# Patient Record
Sex: FEMALE | Race: OTHER | HISPANIC OR LATINO | Employment: FULL TIME | URBAN - METROPOLITAN AREA
[De-identification: names, ages, dates, MRNs, and addresses within clinical notes are randomized per-mention and may not be internally consistent; named-entity substitution may affect disease eponyms.]

---

## 2021-12-07 ENCOUNTER — TELEPHONE (OUTPATIENT)
Dept: SCHEDULING | Facility: IMAGING CENTER | Age: 36
End: 2021-12-07

## 2021-12-09 ENCOUNTER — TELEMEDICINE (OUTPATIENT)
Dept: MEDICAL GROUP | Facility: MEDICAL CENTER | Age: 36
End: 2021-12-09
Payer: COMMERCIAL

## 2021-12-09 VITALS — WEIGHT: 170 LBS | BODY MASS INDEX: 25.76 KG/M2 | TEMPERATURE: 98.6 F | HEIGHT: 68 IN

## 2021-12-09 DIAGNOSIS — Z00.00 HEALTHCARE MAINTENANCE: ICD-10-CM

## 2021-12-09 DIAGNOSIS — F41.1 GAD (GENERALIZED ANXIETY DISORDER): ICD-10-CM

## 2021-12-09 DIAGNOSIS — Z86.59 HISTORY OF BIPOLAR DISORDER: ICD-10-CM

## 2021-12-09 DIAGNOSIS — B97.7 HPV (HUMAN PAPILLOMA VIRUS) INFECTION: ICD-10-CM

## 2021-12-09 DIAGNOSIS — Z00.00 ENCOUNTER FOR MEDICAL EXAMINATION TO ESTABLISH CARE: ICD-10-CM

## 2021-12-09 PROCEDURE — 99204 OFFICE O/P NEW MOD 45 MIN: CPT | Mod: 95,CR | Performed by: PHYSICIAN ASSISTANT

## 2021-12-09 NOTE — ASSESSMENT & PLAN NOTE
Had LEEP procedure in January 2021. Was told she needs follow up every 3 months to check for abnormal cells. Would like Gyn referral

## 2021-12-09 NOTE — ASSESSMENT & PLAN NOTE
Patient is 36-year-old female who is doing virtual visit to establish care.  Just moved to [place] from [place] and is working at [employer].  Works approximately 6 shifts a week, 12 hours.  Has a history of psychiatric illnesses.  States she is very anxious at this time after finding her best friend  in her bed in the beginning of this year.  She states that was secondary to an accidental overdose.  She states she is traumatized from this but feels so anxious that she is having trouble functioning.  States she has been diagnosed with bipolar in the past phalanx anxiety and depression.  She was on multiple medications but has been off of medications for some time.  Would like something to help with anxiety at this time and referral to see a psychiatrist denies homicidal ideation or suicidal ideation. Denies manic episodes currently

## 2021-12-09 NOTE — PROGRESS NOTES
Virtual Visit: New Patient   This visit was conducted via Zoom using secure and encrypted videoconferencing technology. The patient was in a private location in the state of [place].    The patient's identity was confirmed and verbal consent was obtained for this virtual visit.    Subjective:     CC:   Chief Complaint   Patient presents with   • Establish Care   • Referral Needed     GYN - cancerous cervical cells, psychiatry       Mago Craven is a 36 y.o. female presenting to establish care and to discuss the evaluation and management of:    HPV (human papilloma virus) infection  Had LEEP procedure in 2021. Was told she needs follow up every 3 months to check for abnormal cells. Would like Gyn referral    SANTA (generalized anxiety disorder)  Patient is 36-year-old female who is doing virtual visit to establish care.  Just moved to [place] from [place] and is working at [employer].  Works approximately 6 shifts a week, 12 hours.  Has a history of psychiatric illnesses.  States she is very anxious at this time after finding her best friend  in her bed in the beginning of this year.  She states that was secondary to an accidental overdose.  She states she is traumatized from this but feels so anxious that she is having trouble functioning.  States she has been diagnosed with bipolar in the past phalanx anxiety and depression.  She was on multiple medications but has been off of medications for some time.  Would like something to help with anxiety at this time and referral to see a psychiatrist denies homicidal ideation or suicidal ideation. Denies manic episodes currently       ROS  See HPI  Constitutional: Negative for fever, chills and malaise/fatigue.   HENT: Negative for congestion.    Eyes: Negative for pain.   Respiratory: Negative for cough and shortness of breath.    Cardiovascular: Negative for leg swelling.   Gastrointestinal: Negative for nausea, vomiting, abdominal pain and diarrhea.  "  Genitourinary: Negative for dysuria and hematuria.   Skin: Negative for rash.   Neurological: Negative for dizziness, focal weakness and headaches.   Endo/Heme/Allergies: Does not bruise/bleed easily.   Psychiatric/Behavioral: Negative for depression.  The patient is not nervous/anxious.      Allergies   Allergen Reactions   • Penicillins Unspecified     Unknown to patient.       Current medicines (including changes today)  No current outpatient medications on file.     No current facility-administered medications for this visit.       She  has a past medical history of Anemia, Anxiety, Depression, Head ache, and Herpes simplex type 1 infection.  She  has a past surgical history that includes gastric bypass laparoscopic (2006) and cholecystectomy (2011).      Family History   Problem Relation Age of Onset   • Ovarian Cancer Mother      Family Status   Relation Name Status   • Mo  (Not Specified)       Patient Active Problem List    Diagnosis Date Noted   • HPV (human papilloma virus) infection 12/09/2021   • SANTA (generalized anxiety disorder) 12/09/2021          Objective:   Temp 37 °C (98.6 °F)   Ht 1.727 m (5' 8\")   Wt 77.1 kg (170 lb)   LMP 11/27/2021   Breastfeeding No   BMI 25.85 kg/m²     Physical Exam:  Constitutional: Alert, no distress, well-groomed.  Skin: No rashes in visible areas.  Eye: Round. Conjunctiva clear, lids normal. No icterus.   ENMT: Lips pink without lesions, good dentition, moist mucous membranes. Phonation normal.  Neck: No masses, no thyromegaly. Moves freely without pain.  Respiratory: Unlabored respiratory effort, no cough or audible wheeze  Psych: Alert and oriented x3, normal affect and mood.       Assessment and Plan:   The following treatment plan was discussed:     1. Encounter for medical examination to establish care  Discussed importance of diet modification.  Increase protein intake and fruit and vegetables.  Avoid carbohydrate consumption.    2. HPV (human papilloma " virus) infection  Patient has a history of HPV Paps with LEEP done in January 2021.  Was told she needs follow-up every 3 months.  Offered to do repeat Pap with HPV testing in clinic.  Patient would like referral to gynecology  - Referral to Gynecology    3. SANTA (generalized anxiety disorder)  Is a chronic condition with acute exacerbation.  We will try sertraline low-dose and send to psychiatry.  - Comp Metabolic Panel; Future  - CBC WITH DIFFERENTIAL; Future  - TSH WITH REFLEX TO FT4; Future  - VITAMIN D,25 HYDROXY; Future  - Referral to Psychiatry    4. Healthcare maintenance  Above    5. History of bipolar disorder  This is a chronic condition that patient is not being medicated for.  Unclear if true diagnosis.  Do want her evaluated by psychiatry for proper diagnosis and medical management  - Referral to Psychiatry         Follow-up: Return in about 4 weeks (around 1/6/2022).

## 2021-12-13 DIAGNOSIS — F41.1 GAD (GENERALIZED ANXIETY DISORDER): ICD-10-CM

## 2021-12-13 RX ORDER — SERTRALINE HYDROCHLORIDE 25 MG/1
25 TABLET, FILM COATED ORAL DAILY
Qty: 30 TABLET | Refills: 3 | Status: SHIPPED
Start: 2021-12-13 | End: 2022-02-03

## 2021-12-14 ENCOUNTER — TELEPHONE (OUTPATIENT)
Dept: MEDICAL GROUP | Facility: MEDICAL CENTER | Age: 36
End: 2021-12-14

## 2021-12-14 NOTE — TELEPHONE ENCOUNTER
Hi Lisa  Medication has been sent in for the patient and I did send her my chart message.  Thank you for the message

## 2021-12-14 NOTE — TELEPHONE ENCOUNTER
VOICEMAIL  1. Caller Name: Mago                      Call Back Number: [phone]    2. Message: Patient called to ask about prescriptions discussed in recent virtual visit to establish care. Please advise.    3. Patient approves office to leave a detailed voicemail/MyChart message: N\A

## 2022-02-03 ENCOUNTER — TELEMEDICINE (OUTPATIENT)
Dept: MEDICAL GROUP | Facility: MEDICAL CENTER | Age: 37
End: 2022-02-03
Payer: COMMERCIAL

## 2022-02-03 VITALS — WEIGHT: 163 LBS | BODY MASS INDEX: 24.71 KG/M2 | HEIGHT: 68 IN | TEMPERATURE: 96.8 F

## 2022-02-03 DIAGNOSIS — F41.1 GAD (GENERALIZED ANXIETY DISORDER): ICD-10-CM

## 2022-02-03 PROCEDURE — 99213 OFFICE O/P EST LOW 20 MIN: CPT | Mod: 95 | Performed by: PHYSICIAN ASSISTANT

## 2022-02-03 RX ORDER — ALPRAZOLAM 0.25 MG/1
0.25 TABLET ORAL NIGHTLY PRN
Qty: 30 TABLET | Refills: 0 | Status: SHIPPED | OUTPATIENT
Start: 2022-02-03 | End: 2022-03-04 | Stop reason: SDUPTHER

## 2022-02-03 ASSESSMENT — PATIENT HEALTH QUESTIONNAIRE - PHQ9: CLINICAL INTERPRETATION OF PHQ2 SCORE: 0

## 2022-02-03 NOTE — ASSESSMENT & PLAN NOTE
Is here for follow-up virtual visit.  Was started on Zoloft at last visit.  Patient states she stopped taking Zoloft but does feel it helped her focus more.  She was not sure if she should continue it or not.  She was given referral to psychiatry as well and states she did not make the appointment.  Has been working at [employer].  Has not been drinking alcohol.  Still feels very anxious.  Denies homicidal ideation or suicidal ideation

## 2022-02-03 NOTE — PROGRESS NOTES
Virtual Visit: Established Patient   This visit was conducted via Zoom using secure and encrypted videoconferencing technology. The patient was in a private location in the state of [place].    The patient's identity was confirmed and verbal consent was obtained for this virtual visit.    Subjective:   CC:   Chief Complaint   Patient presents with   • Follow-Up     med review   • Anxiety   • Referral Needed     OBGYGILBERT       Mago Craven is a 36 y.o. female presenting for evaluation and management of:    SANTA (generalized anxiety disorder)  Is here for follow-up virtual visit.  Was started on Zoloft at last visit.  Patient states she stopped taking Zoloft but does feel it helped her focus more.  She was not sure if she should continue it or not.  She was given referral to psychiatry as well and states she did not make the appointment.  Has been working at [employer].  Has not been drinking alcohol.  Still feels very anxious.  Denies homicidal ideation or suicidal ideation       ROS   Denies any recent fevers or chills. No nausea or vomiting. No chest pains or shortness of breath.     Allergies   Allergen Reactions   • Penicillins Unspecified     Unknown to patient.       Current medicines (including changes today)  Current Outpatient Medications   Medication Sig Dispense Refill   • sertraline (ZOLOFT) 50 MG Tab Take 1/2 pill daily for two weeks. Thereafter, take one pill daily in the morning. 30 Tablet 2   • ALPRAZolam (XANAX) 0.25 MG Tab Take 1 Tablet by mouth at bedtime as needed for Sleep or Anxiety for up to 30 days. 30 Tablet 0     No current facility-administered medications for this visit.       Patient Active Problem List    Diagnosis Date Noted   • HPV (human papilloma virus) infection 12/09/2021   • SANTA (generalized anxiety disorder) 12/09/2021       Family History   Problem Relation Age of Onset   • Ovarian Cancer Mother        She  has a past medical history of Anemia, Anxiety, Depression, Head ache, and  "Herpes simplex type 1 infection.  She  has a past surgical history that includes gastric bypass laparoscopic (2006) and cholecystectomy (2011).       Objective:   Temp 36 °C (96.8 °F)   Ht 1.727 m (5' 8\")   Wt 73.9 kg (163 lb)   BMI 24.78 kg/m²     Physical Exam:  Constitutional: Alert, no distress, well-groomed.  Skin: No rashes in visible areas.  Eye: Round. Conjunctiva clear, lids normal. No icterus.   ENMT: Lips pink without lesions, good dentition, moist mucous membranes. Phonation normal.  Neck: No masses, no thyromegaly. Moves freely without pain.  Respiratory: Unlabored respiratory effort, no cough or audible wheeze  Psych: Alert and oriented x3, normal affect and mood.       Assessment and Plan:   The following treatment plan was discussed:     1. SANTA (generalized anxiety disorder)  Chronic and unstable  Discussed lifestyle and exercise at length. Must exercise. Eat fresh fruits and veggies  -referral already given for psychiatry. Please make appointment  - sertraline (ZOLOFT) 50 MG Tab; Take 1/2 pill daily for two weeks. Thereafter, take one pill daily in the morning.  Dispense: 30 Tablet; Refill: 2        Follow-up: No follow-ups on file.         "

## 2022-03-04 DIAGNOSIS — F41.1 GAD (GENERALIZED ANXIETY DISORDER): ICD-10-CM

## 2022-03-08 RX ORDER — ALPRAZOLAM 0.25 MG/1
0.25 TABLET ORAL NIGHTLY PRN
Qty: 30 TABLET | Refills: 0 | OUTPATIENT
Start: 2022-03-08 | End: 2022-04-07

## 2022-03-08 RX ORDER — ALPRAZOLAM 0.25 MG/1
0.25 TABLET ORAL NIGHTLY PRN
Qty: 30 TABLET | Refills: 0 | Status: SHIPPED | OUTPATIENT
Start: 2022-03-08 | End: 2022-04-07

## 2023-05-14 ENCOUNTER — APPOINTMENT (OUTPATIENT)
Dept: RADIOLOGY | Facility: IMAGING CENTER | Age: 38
End: 2023-05-14
Attending: NURSE PRACTITIONER
Payer: COMMERCIAL

## 2023-05-14 ENCOUNTER — OFFICE VISIT (OUTPATIENT)
Dept: URGENT CARE | Facility: CLINIC | Age: 38
End: 2023-05-14
Payer: COMMERCIAL

## 2023-05-14 VITALS
RESPIRATION RATE: 16 BRPM | DIASTOLIC BLOOD PRESSURE: 74 MMHG | SYSTOLIC BLOOD PRESSURE: 100 MMHG | HEART RATE: 72 BPM | TEMPERATURE: 97.9 F | HEIGHT: 67 IN | BODY MASS INDEX: 24.33 KG/M2 | WEIGHT: 155 LBS | OXYGEN SATURATION: 98 %

## 2023-05-14 DIAGNOSIS — S92.514A CLOSED NONDISPLACED FRACTURE OF PROXIMAL PHALANX OF LESSER TOE OF RIGHT FOOT, INITIAL ENCOUNTER: ICD-10-CM

## 2023-05-14 DIAGNOSIS — M79.674 TOE PAIN, RIGHT: ICD-10-CM

## 2023-05-14 PROCEDURE — 73660 X-RAY EXAM OF TOE(S): CPT | Mod: TC,RT | Performed by: NURSE PRACTITIONER

## 2023-05-14 PROCEDURE — 3074F SYST BP LT 130 MM HG: CPT | Performed by: NURSE PRACTITIONER

## 2023-05-14 PROCEDURE — 3078F DIAST BP <80 MM HG: CPT | Performed by: NURSE PRACTITIONER

## 2023-05-14 PROCEDURE — 99213 OFFICE O/P EST LOW 20 MIN: CPT | Performed by: NURSE PRACTITIONER

## 2023-05-14 NOTE — LETTER
May 14, 2023    To Whom It May Concern:         This is confirmation that Mago Craven attended her scheduled appointment with LIAM Myers on 5/14/23.  Please let her to work in a light duty fashion for the next 2 weeks until seen by our sports medicine physician due to an acute fracture.         If you have any questions please do not hesitate to call me at the phone number listed below.    Sincerely,          DB Myers.  [phone]

## 2023-05-15 NOTE — PROGRESS NOTES
"Subjective:   Mago Craven is a 38 y.o. female who presents for Toe Injury (R pinky toe)       HPI  Patient presents for evaluation of right toe injury, after dropping of the dorsum of her pinky toe against an ottoman.  Patient had immediate pain and difficulty with standing/walking.  She has not taken anything yet for his symptoms as a happened immediately prior to this visit.  Denies numbness, tingling, or loss of sensation.    ROS  All other systems are negative except as documented above within HPI.    MEDS:   Current Outpatient Medications:     sertraline (ZOLOFT) 50 MG Tab, Take 1/2 pill daily for two weeks. Thereafter, take one pill daily in the morning. (Patient not taking: Reported on 5/14/2023), Disp: 30 Tablet, Rfl: 2  ALLERGIES:   Allergies   Allergen Reactions    Penicillins Unspecified     Unknown to patient.       Patient's PMH, SocHx, SurgHx, FamHx, Drug allergies and medications were reviewed.     Objective:   /74 (BP Location: Left arm, Patient Position: Sitting, BP Cuff Size: Adult)   Pulse 72   Temp 36.6 °C (97.9 °F) (Temporal)   Resp 16   Ht 1.702 m (5' 7\")   Wt 70.3 kg (155 lb)   SpO2 98%   BMI 24.28 kg/m²     Physical Exam  Vitals and nursing note reviewed.   Constitutional:       General: She is awake.      Appearance: Normal appearance. She is well-developed.   HENT:      Head: Normocephalic and atraumatic.      Right Ear: External ear normal.      Left Ear: External ear normal.      Nose: Nose normal.      Mouth/Throat:      Mouth: Mucous membranes are moist.      Pharynx: Oropharynx is clear.   Eyes:      Extraocular Movements: Extraocular movements intact.      Conjunctiva/sclera: Conjunctivae normal.   Cardiovascular:      Rate and Rhythm: Normal rate and regular rhythm.   Pulmonary:      Effort: Pulmonary effort is normal.      Breath sounds: Normal breath sounds.   Musculoskeletal:         General: Normal range of motion.      Cervical back: Normal range of motion and " neck supple.        Feet:    Feet:      Comments: +edema, ecchymosis  Skin:     General: Skin is warm and dry.   Neurological:      Mental Status: She is alert and oriented to person, place, and time.   Psychiatric:         Mood and Affect: Mood normal.         Behavior: Behavior normal.         Thought Content: Thought content normal.       DX-TOE(S) 2+ RIGHT    Result Date: 5/14/2023 5/14/2023 9:00 PM HISTORY/REASON FOR EXAM:  Pain/Deformity Following Trauma; 5th toe. TECHNIQUE/EXAM DESCRIPTION AND NUMBER OF VIEWS:  3 views of the RIGHT toes. COMPARISON: None FINDINGS: There is fifth toe proximal phalanx neck fracture.     1.  Fifth toe proximal phalanx neck fracture     Assessment/Plan:   Assessment    1. Closed nondisplaced fracture of proximal phalanx of lesser toe of right foot, initial encounter    2. Toe pain, right  - DX-TOE(S) 2+ RIGHT; Future      Vital signs stable at today's acute urgent care visit.  Orion taped toes in clinic.  Discussed RICE therapies.    Advised the patient to follow-up with the primary care provider/urgent care if symptoms persist.  Red flags discussed and indications to immediately call 911 or present to the ED. All questions were encouraged and answered to the patient's satisfaction and understanding, and they agree to the plan of care.     This is an acute problem with uncertain prognosis, medication management and instructions as well as management options were provided.  I personally reviewed prior external notes and test results pertinent to today and independently reviewed and interpreted all diagnostics, to include POC testing. Time spent evaluating this patient includes preparing for visit, counseling/education, exam, evaluation, obtaining history, and ordering lab/test/procedures.      Please note that this dictation was created using voice recognition software. I have made a reasonable attempt to correct obvious errors, but I expect that there are errors of grammar and  possibly content that I did not discover before finalizing the note.

## 2023-09-17 ENCOUNTER — HOSPITAL ENCOUNTER (EMERGENCY)
Facility: MEDICAL CENTER | Age: 38
End: 2023-09-17
Attending: EMERGENCY MEDICINE
Payer: COMMERCIAL

## 2023-09-17 ENCOUNTER — OFFICE VISIT (OUTPATIENT)
Dept: URGENT CARE | Facility: CLINIC | Age: 38
End: 2023-09-17
Payer: COMMERCIAL

## 2023-09-17 ENCOUNTER — APPOINTMENT (OUTPATIENT)
Dept: RADIOLOGY | Facility: MEDICAL CENTER | Age: 38
End: 2023-09-17
Attending: EMERGENCY MEDICINE
Payer: COMMERCIAL

## 2023-09-17 VITALS
WEIGHT: 161.38 LBS | DIASTOLIC BLOOD PRESSURE: 69 MMHG | OXYGEN SATURATION: 97 % | HEART RATE: 64 BPM | RESPIRATION RATE: 18 BRPM | BODY MASS INDEX: 24.46 KG/M2 | SYSTOLIC BLOOD PRESSURE: 110 MMHG | TEMPERATURE: 98.5 F | HEIGHT: 68 IN

## 2023-09-17 VITALS
HEART RATE: 87 BPM | BODY MASS INDEX: 24.43 KG/M2 | OXYGEN SATURATION: 98 % | DIASTOLIC BLOOD PRESSURE: 64 MMHG | WEIGHT: 161.2 LBS | RESPIRATION RATE: 20 BRPM | HEIGHT: 68 IN | SYSTOLIC BLOOD PRESSURE: 116 MMHG | TEMPERATURE: 98 F

## 2023-09-17 DIAGNOSIS — M54.2 NECK PAIN: ICD-10-CM

## 2023-09-17 DIAGNOSIS — R51.9 NONINTRACTABLE HEADACHE, UNSPECIFIED CHRONICITY PATTERN, UNSPECIFIED HEADACHE TYPE: ICD-10-CM

## 2023-09-17 DIAGNOSIS — R51.9 ACUTE NONINTRACTABLE HEADACHE, UNSPECIFIED HEADACHE TYPE: ICD-10-CM

## 2023-09-17 PROCEDURE — 700111 HCHG RX REV CODE 636 W/ 250 OVERRIDE (IP): Mod: JZ | Performed by: EMERGENCY MEDICINE

## 2023-09-17 PROCEDURE — 96372 THER/PROPH/DIAG INJ SC/IM: CPT

## 2023-09-17 PROCEDURE — 3074F SYST BP LT 130 MM HG: CPT | Performed by: FAMILY MEDICINE

## 2023-09-17 PROCEDURE — 70450 CT HEAD/BRAIN W/O DYE: CPT

## 2023-09-17 PROCEDURE — 3078F DIAST BP <80 MM HG: CPT | Performed by: FAMILY MEDICINE

## 2023-09-17 PROCEDURE — 99283 EMERGENCY DEPT VISIT LOW MDM: CPT

## 2023-09-17 PROCEDURE — 99214 OFFICE O/P EST MOD 30 MIN: CPT | Performed by: FAMILY MEDICINE

## 2023-09-17 RX ORDER — IBUPROFEN 100 MG/1
400 TABLET, CHEWABLE ORAL EVERY 8 HOURS PRN
Qty: 60 TABLET | Refills: 0 | Status: SHIPPED | OUTPATIENT
Start: 2023-09-17

## 2023-09-17 RX ORDER — KETOROLAC TROMETHAMINE 30 MG/ML
60 INJECTION, SOLUTION INTRAMUSCULAR; INTRAVENOUS ONCE
Status: COMPLETED | OUTPATIENT
Start: 2023-09-17 | End: 2023-09-17

## 2023-09-17 RX ADMIN — KETOROLAC TROMETHAMINE 60 MG: 30 INJECTION, SOLUTION INTRAMUSCULAR at 18:14

## 2023-09-17 ASSESSMENT — ENCOUNTER SYMPTOMS
MYALGIAS: 0
DOUBLE VISION: 0
DIZZINESS: 1
CHILLS: 0
TINGLING: 1
HEADACHES: 1
NECK PAIN: 1
FEVER: 0
FOCAL WEAKNESS: 0
BLURRED VISION: 0
SORE THROAT: 0
VOMITING: 0
NAUSEA: 0
COUGH: 0
SHORTNESS OF BREATH: 0

## 2023-09-18 NOTE — ED NOTES
Bedside report received from previous shift.   Assumed patient care. Verified patient identification.  Checked on bed, connected to monitor,  with unlabored respirations. Discussed plan of care.   Vital signs is stable. Denied any new complaints.   Gurney in low position, side rail up for pt safety. Call light within reach.   No needs identified at the moment. Instructed to use call light when needed.    Contraptions: None  Alert and Oriented: x 4  Ambulatory: yes  Oxygen: No  Pending:None

## 2023-09-18 NOTE — ED NOTES
Pt discharged to home. Discharge paperwork provided. Education provided by ERP. Reinforced discharge instructions.  Pt was given follow up instructions and prescriptions.  Pt verbalized understanding of all instructions for discharge.   Patient went out of the ER ambulatory with steady gait., alert and oriented x 4, with all belongings.

## 2023-09-18 NOTE — PROGRESS NOTES
Subjective:   Mago Craven is a 38 y.o. female who presents for Headache (x3-6 months Head pressure/loss of hearing/)        38-year-old presents to the urgent care with chief complaint of headache, sudden onset yesterday at 10 PM described as worst headache ever experienced which caused patient to become tearful and was associated with perioral numbness and tingling.  Patient reports a headache has continued and is limiting ability to function at work.  Patient describes a sudden onset of the headache and describes never having experienced a similar headache previously.  The patient reports intermittent neck pain at work and has received manual manipulation of the neck on the worksite.  Continues to report of intermittent neck pain and limitations in full range of motion of the neck.  The patient does specify the worst pain is take at this time and denies severe or limiting neck pain.  Denies nausea or vomiting.  Patient denies loss of vision.  Reports intermittent ataxia with walking.    Other  Chronicity: Reports severe sudden onset headache. The current episode started yesterday. The problem occurs constantly. The problem has been unchanged. Associated symptoms include headaches and neck pain. Pertinent negatives include no chills, coughing, fever, myalgias, nausea, rash, sore throat or vomiting. She has tried rest for the symptoms. The treatment provided no relief.     PMH:  has a past medical history of Anemia, Anxiety, Depression, Head ache, and Herpes simplex type 1 infection.  MEDS:   Current Outpatient Medications:     sertraline (ZOLOFT) 50 MG Tab, Take 1/2 pill daily for two weeks. Thereafter, take one pill daily in the morning. (Patient not taking: Reported on 5/14/2023), Disp: 30 Tablet, Rfl: 2  ALLERGIES:   Allergies   Allergen Reactions    Penicillins Unspecified     Unknown to patient.     SURGHX:   Past Surgical History:   Procedure Laterality Date    CHOLECYSTECTOMY  2011    GASTRIC BYPASS  "LAPAROSCOPIC  2006     SOCHX:  reports that she has been smoking cigarettes. She has never used smokeless tobacco. She reports that she does not currently use alcohol. She reports that she does not currently use drugs.  FH:   Family History   Problem Relation Age of Onset    Ovarian Cancer Mother      Review of Systems   Constitutional:  Negative for chills and fever.   HENT:  Negative for sore throat.    Eyes:  Negative for blurred vision and double vision.   Respiratory:  Negative for cough and shortness of breath.    Gastrointestinal:  Negative for nausea and vomiting.   Musculoskeletal:  Positive for neck pain. Negative for myalgias.   Skin:  Negative for rash.   Neurological:  Positive for dizziness, tingling and headaches. Negative for focal weakness.        Objective:   /64 (BP Location: Left arm, Patient Position: Sitting)   Pulse 87   Temp 36.7 °C (98 °F) (Temporal)   Resp 20   Ht 1.727 m (5' 8\")   Wt 73.1 kg (161 lb 3.2 oz)   LMP 08/28/2023 (Exact Date)   SpO2 98%   BMI 24.51 kg/m²   Physical Exam  Vitals and nursing note reviewed.   Constitutional:       General: She is not in acute distress.     Appearance: She is well-developed.   HENT:      Head: Normocephalic and atraumatic.      Right Ear: External ear normal.      Left Ear: External ear normal.      Nose: Nose normal.      Mouth/Throat:      Mouth: Mucous membranes are moist.   Eyes:      Extraocular Movements: Extraocular movements intact.      Conjunctiva/sclera: Conjunctivae normal.      Pupils: Pupils are equal, round, and reactive to light.   Neck:      Comments: Range of motion of the neck intact yet with limitation in endrange bilateral rotation, flexion and extension  Cardiovascular:      Rate and Rhythm: Normal rate.   Pulmonary:      Effort: Pulmonary effort is normal. No respiratory distress.      Breath sounds: Normal breath sounds.   Abdominal:      General: There is no distension.   Musculoskeletal:         General: " Normal range of motion.      Cervical back: No rigidity.   Skin:     General: Skin is warm and dry.   Neurological:      General: No focal deficit present.      Mental Status: She is alert and oriented to person, place, and time. Mental status is at baseline.      Coordination: Romberg sign negative. Coordination abnormal (Mild ataxia heel toe).      Gait: Gait (gait at baseline) normal.   Psychiatric:         Judgment: Judgment normal.           Assessment/Plan:   1. Acute nonintractable headache, unspecified headache type    2. Neck pain        Medical Decision Making/Course:  The context of the clinical presentation of sudden onset limiting headache with neurological symptoms with ataxia and with recent history of cervical manual manipulation there is concern for subarachnoid hemorrhage, vertebral artery dissection, and other intracerebral and vascular etiologies and emergency department evaluation management is warranted.  The patient deferred EMS ambulance transfer requested transport by private vehicle and the [facility] emergency department was advised.  In the course of preparing for this visit with review of the pertinent past medical history, recent and past clinic visits, current medications, and performing chart, immunization, medical history and medication reconciliation, and in the further course of obtaining the current history pertinent to the clinic visit today, performing an exam and evaluation, ordering and independently evaluating labs, tests  , and/or procedures, prescribing any recommended new medications as noted above, providing any pertinent counseling and education and recommending further coordination of care including contact and coordination with transfer center, at least  22 minutes of total time were spent during this encounter.        Please note that this dictation was created using voice recognition software. I have worked with consultants from the vendor as well  as technical experts from [facility] to optimize the interface. I have made every reasonable attempt to correct obvious errors, but I expect that there are errors of grammar and possibly content that I did not discover before finalizing the note.

## 2023-09-18 NOTE — ED PROVIDER NOTES
"CHIEF COMPLAINT  Chief Complaint   Patient presents with    Headache     Since yesterday, HX of migraines        HPI  Mago Craven is a 38 y.o. female who presents with headache.  Patient reports that almost every day she has a headache.  She does a lot of stressful work at [employer].  She is required to do lifting.  Her headaches are provoked by loud noises.  The head hurts in the back of the head radiates around to the front.  Over the last few days the headaches seem worse.  No thunderclap.  She feels her neck is \"crunchy \" but no neck stiffness.  She was told by the masseuse at work that she has lumps in her neck.  She has not had a fever.  No congestion runny nose.  She has not taken any medication for her headache as she reports that she has a difficult time taking pills.  No vision change, weakness numbness neurologic symptoms.  No eye pain.    REVIEW OF SYSTEMS  See HPI for further details. All other systems are negative.     PAST MEDICAL HISTORY  Past Medical History:   Diagnosis Date    Anemia     hx of gastric surgery    Anxiety     Depression     Head ache     Herpes simplex type 1 infection        FAMILY HISTORY  Family History   Problem Relation Age of Onset    Ovarian Cancer Mother        SOCIAL HISTORY  Social History     Socioeconomic History    Marital status: Single   Tobacco Use    Smoking status: Some Days     Types: Cigarettes    Smokeless tobacco: Never   Vaping Use    Vaping Use: Never used   Substance and Sexual Activity    Alcohol use: Not Currently    Drug use: Not Currently       SURGICAL HISTORY  Past Surgical History:   Procedure Laterality Date    CHOLECYSTECTOMY  2011    GASTRIC BYPASS LAPAROSCOPIC  2006       CURRENT MEDICATIONS  Home Medications       Reviewed by Mimi Haas R.N. (Registered Nurse) on 09/17/23 at 1731  Med List Status: Partial     Medication Last Dose Status   sertraline (ZOLOFT) 50 MG Tab  Active                    ALLERGIES  Allergies   Allergen Reactions " "   Penicillins Unspecified     Unknown to patient.       PHYSICAL EXAM  VITAL SIGNS: /64   Pulse 86   Temp 36.3 °C (97.3 °F) (Temporal)   Resp 18   Ht 1.727 m (5' 8\")   Wt 73.2 kg (161 lb 6 oz)   LMP 08/28/2023 (Exact Date)   SpO2 98%   BMI 24.54 kg/m²       Constitutional:  Well developed, Well nourished, No acute distress, Non-toxic appearance.   HENT: Head is nontender.  Tympanic membranes clear.  Eyes: PERRLA, EOMI, Conjunctiva normal, No discharge.   Neck: Normal range of motion, No tenderness, Supple, No stridor.  No neck mass  Lymphatic: No lymphadenopathy noted.   Cardiovascular: Normal heart rate  Thorax & Lungs: No respiratory distress  Skin: Warm, Dry, No erythema, No rash.   Extremities: Intact distal pulses, No edema, No tenderness, No cyanosis, No clubbing.   Neurologic: Alert & oriented x 3, Normal motor function, Normal sensory function, No focal deficits noted.   Psychiatric: Affect normal, Judgment normal, Mood normal.     RADIOLOGY/PROCEDURES  CT head negative on my review    CT-HEAD W/O   Final Result      Negative noncontrast CT scan of the head / brain.               COURSE & MEDICAL DECISION MAKING  Patient presents with acute headache.  Her vital signs are normal.  She is afebrile.  She has no neurologic symptoms.  No thunderclap.  No ocular complaints.  No major red flags aside from the fact that she is having a headache every day.  Because of this I ordered a CT scan of the head.  She was treated with Toradol.    Patient felt better after Toradol.    CT head was negative    At this point I suspect this is a tension type headache.  Likely worsened with night shift as well as heavy lifting and tension at work.  Management is symptomatic.  She cannot take pills very well I have prescribed ibuprofen chewables.  She will be discharged.  I would like her to follow-up with her doctor for recheck.  I precautioned her to return to the ER if she has any neurologic symptoms, vision " change, uncontrolled headache with treatment or concern      Considered blood work, but not indicated secondary to a lack of fever or alarming findings on examination    Prescribed ibuprofen    FINAL IMPRESSION  1.  Benign cephalgia, tension variant        Electronically signed by: Kelechi Joseph M.D., 9/17/2023 6:21 PM

## 2023-09-18 NOTE — ED TRIAGE NOTES
Chief Complaint   Patient presents with    Headache     Since yesterday, HX of migraines       Pt ambulate to triage with above complaint. Pt reports this headache is so bad it is making her lips tingle. Feel like she has increased sinus pressure.    Pt educated on triage process and returned to lobby.

## 2023-09-18 NOTE — ED NOTES
PT STATES INTERMITTENT HEADACHES FOR THE PAST 3-6 MONTHS. PATIENT STATES BAD HA SINCE LAST NIGHT WITH LIP TINGLING AND STATES SHE CAN HEAR HERSELF SPEAK.     PT IS RESTING IN BED. BREATHING IS EVEN AND UNLABORED, SKIN IS WARM AND DRY, VSS, NAD, WILL CONTINUE TO MONITOR. PT FAMILY AT BEDSIDE. PENDING MD EVALUATION.

## 2023-11-08 ENCOUNTER — HOSPITAL ENCOUNTER (EMERGENCY)
Facility: MEDICAL CENTER | Age: 38
End: 2023-11-08
Attending: EMERGENCY MEDICINE
Payer: COMMERCIAL

## 2023-11-08 ENCOUNTER — APPOINTMENT (OUTPATIENT)
Dept: RADIOLOGY | Facility: MEDICAL CENTER | Age: 38
End: 2023-11-08
Attending: EMERGENCY MEDICINE
Payer: COMMERCIAL

## 2023-11-08 VITALS
RESPIRATION RATE: 17 BRPM | OXYGEN SATURATION: 93 % | HEIGHT: 68 IN | SYSTOLIC BLOOD PRESSURE: 94 MMHG | BODY MASS INDEX: 25.23 KG/M2 | HEART RATE: 60 BPM | DIASTOLIC BLOOD PRESSURE: 61 MMHG | WEIGHT: 166.45 LBS | TEMPERATURE: 98.1 F

## 2023-11-08 DIAGNOSIS — D64.9 CHRONIC ANEMIA: ICD-10-CM

## 2023-11-08 DIAGNOSIS — N83.202 OVARIAN CYST, LEFT: ICD-10-CM

## 2023-11-08 LAB
ALBUMIN SERPL BCP-MCNC: 4.2 G/DL (ref 3.2–4.9)
ALBUMIN/GLOB SERPL: 1.4 G/DL
ALP SERPL-CCNC: 71 U/L (ref 30–99)
ALT SERPL-CCNC: 12 U/L (ref 2–50)
ANION GAP SERPL CALC-SCNC: 11 MMOL/L (ref 7–16)
AST SERPL-CCNC: 20 U/L (ref 12–45)
BASOPHILS # BLD AUTO: 0.4 % (ref 0–1.8)
BASOPHILS # BLD: 0.03 K/UL (ref 0–0.12)
BILIRUB SERPL-MCNC: 0.4 MG/DL (ref 0.1–1.5)
BUN SERPL-MCNC: 11 MG/DL (ref 8–22)
CALCIUM ALBUM COR SERPL-MCNC: 8.6 MG/DL (ref 8.5–10.5)
CALCIUM SERPL-MCNC: 8.8 MG/DL (ref 8.5–10.5)
CHLORIDE SERPL-SCNC: 105 MMOL/L (ref 96–112)
CO2 SERPL-SCNC: 21 MMOL/L (ref 20–33)
COMMENT 1642: NORMAL
CREAT SERPL-MCNC: 0.68 MG/DL (ref 0.5–1.4)
EOSINOPHIL # BLD AUTO: 0.13 K/UL (ref 0–0.51)
EOSINOPHIL NFR BLD: 1.9 % (ref 0–6.9)
ERYTHROCYTE [DISTWIDTH] IN BLOOD BY AUTOMATED COUNT: 41.2 FL (ref 35.9–50)
GFR SERPLBLD CREATININE-BSD FMLA CKD-EPI: 113 ML/MIN/1.73 M 2
GLOBULIN SER CALC-MCNC: 2.9 G/DL (ref 1.9–3.5)
GLUCOSE SERPL-MCNC: 128 MG/DL (ref 65–99)
HCG SERPL QL: NEGATIVE
HCT VFR BLD AUTO: 31.1 % (ref 37–47)
HGB BLD-MCNC: 8.1 G/DL (ref 12–16)
HYPOCHROMIA BLD QL SMEAR: ABNORMAL
IMM GRANULOCYTES # BLD AUTO: 0.02 K/UL (ref 0–0.11)
IMM GRANULOCYTES NFR BLD AUTO: 0.3 % (ref 0–0.9)
LIPASE SERPL-CCNC: 34 U/L (ref 11–82)
LYMPHOCYTES # BLD AUTO: 1.95 K/UL (ref 1–4.8)
LYMPHOCYTES NFR BLD: 28.1 % (ref 22–41)
MCH RBC QN AUTO: 15.8 PG (ref 27–33)
MCHC RBC AUTO-ENTMCNC: 26 G/DL (ref 32.2–35.5)
MCV RBC AUTO: 60.7 FL (ref 81.4–97.8)
MICROCYTES BLD QL SMEAR: ABNORMAL
MONOCYTES # BLD AUTO: 0.96 K/UL (ref 0–0.85)
MONOCYTES NFR BLD AUTO: 13.8 % (ref 0–13.4)
MORPHOLOGY BLD-IMP: NORMAL
NEUTROPHILS # BLD AUTO: 3.86 K/UL (ref 1.82–7.42)
NEUTROPHILS NFR BLD: 55.5 % (ref 44–72)
NRBC # BLD AUTO: 0 K/UL
NRBC BLD-RTO: 0 /100 WBC (ref 0–0.2)
OVALOCYTES BLD QL SMEAR: NORMAL
PLATELET # BLD AUTO: 456 K/UL (ref 164–446)
PLATELET BLD QL SMEAR: NORMAL
PMV BLD AUTO: 10.1 FL (ref 9–12.9)
POIKILOCYTOSIS BLD QL SMEAR: NORMAL
POTASSIUM SERPL-SCNC: 3.8 MMOL/L (ref 3.6–5.5)
PROT SERPL-MCNC: 7.1 G/DL (ref 6–8.2)
RBC # BLD AUTO: 5.12 M/UL (ref 4.2–5.4)
RBC BLD AUTO: PRESENT
SODIUM SERPL-SCNC: 137 MMOL/L (ref 135–145)
WBC # BLD AUTO: 7 K/UL (ref 4.8–10.8)

## 2023-11-08 PROCEDURE — 83690 ASSAY OF LIPASE: CPT

## 2023-11-08 PROCEDURE — 700111 HCHG RX REV CODE 636 W/ 250 OVERRIDE (IP): Performed by: EMERGENCY MEDICINE

## 2023-11-08 PROCEDURE — 96375 TX/PRO/DX INJ NEW DRUG ADDON: CPT

## 2023-11-08 PROCEDURE — 700102 HCHG RX REV CODE 250 W/ 637 OVERRIDE(OP): Performed by: EMERGENCY MEDICINE

## 2023-11-08 PROCEDURE — 99285 EMERGENCY DEPT VISIT HI MDM: CPT

## 2023-11-08 PROCEDURE — A9270 NON-COVERED ITEM OR SERVICE: HCPCS | Performed by: EMERGENCY MEDICINE

## 2023-11-08 PROCEDURE — 84703 CHORIONIC GONADOTROPIN ASSAY: CPT

## 2023-11-08 PROCEDURE — 36415 COLL VENOUS BLD VENIPUNCTURE: CPT

## 2023-11-08 PROCEDURE — 85025 COMPLETE CBC W/AUTO DIFF WBC: CPT

## 2023-11-08 PROCEDURE — 76830 TRANSVAGINAL US NON-OB: CPT

## 2023-11-08 PROCEDURE — 96374 THER/PROPH/DIAG INJ IV PUSH: CPT

## 2023-11-08 PROCEDURE — 80053 COMPREHEN METABOLIC PANEL: CPT

## 2023-11-08 RX ORDER — ONDANSETRON 2 MG/ML
4 INJECTION INTRAMUSCULAR; INTRAVENOUS ONCE
Status: COMPLETED | OUTPATIENT
Start: 2023-11-08 | End: 2023-11-08

## 2023-11-08 RX ORDER — ONDANSETRON 4 MG/1
4 TABLET, ORALLY DISINTEGRATING ORAL EVERY 6 HOURS PRN
Qty: 10 TABLET | Refills: 0 | Status: SHIPPED | OUTPATIENT
Start: 2023-11-08

## 2023-11-08 RX ORDER — HYDROCODONE BITARTRATE AND ACETAMINOPHEN 5; 325 MG/1; MG/1
1-2 TABLET ORAL EVERY 6 HOURS PRN
Qty: 15 TABLET | Refills: 0 | Status: SHIPPED | OUTPATIENT
Start: 2023-11-08 | End: 2023-11-11

## 2023-11-08 RX ORDER — HYDROCODONE BITARTRATE AND ACETAMINOPHEN 5; 325 MG/1; MG/1
2 TABLET ORAL ONCE
Status: COMPLETED | OUTPATIENT
Start: 2023-11-08 | End: 2023-11-08

## 2023-11-08 RX ORDER — HYDROMORPHONE HYDROCHLORIDE 1 MG/ML
1 INJECTION, SOLUTION INTRAMUSCULAR; INTRAVENOUS; SUBCUTANEOUS ONCE
Status: COMPLETED | OUTPATIENT
Start: 2023-11-08 | End: 2023-11-08

## 2023-11-08 RX ORDER — KETOROLAC TROMETHAMINE 30 MG/ML
15 INJECTION, SOLUTION INTRAMUSCULAR; INTRAVENOUS ONCE
Status: COMPLETED | OUTPATIENT
Start: 2023-11-08 | End: 2023-11-08

## 2023-11-08 RX ADMIN — ONDANSETRON 4 MG: 2 INJECTION INTRAMUSCULAR; INTRAVENOUS at 10:28

## 2023-11-08 RX ADMIN — HYDROCODONE BITARTRATE AND ACETAMINOPHEN 2 TABLET: 5; 325 TABLET ORAL at 12:38

## 2023-11-08 RX ADMIN — HYDROMORPHONE HYDROCHLORIDE 1 MG: 1 INJECTION, SOLUTION INTRAMUSCULAR; INTRAVENOUS; SUBCUTANEOUS at 10:28

## 2023-11-08 RX ADMIN — KETOROLAC TROMETHAMINE 15 MG: 30 INJECTION INTRAMUSCULAR; INTRAVENOUS at 12:38

## 2023-11-08 ASSESSMENT — PAIN DESCRIPTION - PAIN TYPE
TYPE: ACUTE PAIN

## 2023-11-08 NOTE — ED PROVIDER NOTES
"ED Provider Note    CHIEF COMPLAINT  Chief Complaint   Patient presents with    Abdominal Pain     Pt c/o LLQ since yesterday. Pt states it feels like \"a pinch and like a balloon is in her abdomin.\"  Pt states the pain has been a constant 8/10 since yesterday. Pt denies N/V and fevers.         EXTERNAL RECORDS REVIEWED  Reviewed ER visit and imaging studies from encounter dated September 17, 2023.  CT scan of the head was negative    HPI/ROS  LIMITATION TO HISTORY   Partner provided additional history  OUTSIDE HISTORIAN(S):  Partner provided additional history    Mago Craven is a 38 y.o. female who presents for evaluation of abrupt onset severe left lower quadrant/adnexal discomfort.  The patient reports waves of nausea but no vomiting or diarrhea.  She specifically denies urinary symptoms such as dysuria or hematuria.  No vaginal bleeding or discharge.  She reports she is not pregnant.  She has extensive surgical history including gastric bypass approximately 17 years ago and cholecystectomy around 12 years ago.  Pain is sharp intermittent comes and goes in waves.  No report of any fevers or chills.Nothing makes it better or worse    PAST MEDICAL HISTORY   has a past medical history of Anemia, Anxiety, Depression, Head ache, and Herpes simplex type 1 infection.    SURGICAL HISTORY   has a past surgical history that includes gastric bypass laparoscopic (2006) and cholecystectomy (2011).    FAMILY HISTORY  Family History   Problem Relation Age of Onset    Ovarian Cancer Mother        SOCIAL HISTORY  Social History     Tobacco Use    Smoking status: Some Days     Types: Cigarettes    Smokeless tobacco: Never   Vaping Use    Vaping Use: Never used   Substance and Sexual Activity    Alcohol use: Yes     Comment: occ    Drug use: Yes     Types: Inhaled     Comment: occ marijuana    Sexual activity: Not on file       CURRENT MEDICATIONS  Home Medications       Reviewed by Whitley Hunter R.N. (Registered " "Nurse) on 11/08/23 at 0800  Med List Status: Not Addressed     Medication Last Dose Status   ibuprofen (MOTRIN) 100 MG tablet  Active   sertraline (ZOLOFT) 50 MG Tab  Active                    ALLERGIES  Allergies   Allergen Reactions    Penicillins Unspecified     Unknown to patient.       PHYSICAL EXAM  VITAL SIGNS: BP 94/61   Pulse 60   Temp 36.7 °C (98.1 °F) (Temporal)   Resp 17   Ht 1.727 m (5' 8\")   Wt 75.5 kg (166 lb 7.2 oz)   SpO2 93%   BMI 25.31 kg/m²    Pulse ox interpretation: I interpret  this pulse ox as normal.  Constitutional: Alert and oriented x 3, moderate distress  HEENT: Atraumatic normocephalic, pupils are equal round reactive to light extraocular movements are intact. The nares is clear, external ears are normal, mouth shows moist mucous membranes normal dentition for age  Neck: Supple, no JVD no tracheal deviation  Cardiovascular: Regular rate and rhythm no murmur rub or gallop 2+ pulses peripherally x4  Thorax & Lungs: No respiratory distress, no wheezes rales or rhonchi, No chest tenderness.   GI: Soft nontender nondistended positive bowel sounds, no peritoneal signs reproducible left adnexal discomfort without palpable mass  Skin: Warm dry no acute rash or lesion  Musculoskeletal: Moving all extremities with full range and 5 of 5 strength no acute  deformity  Neurologic: Cranial nerves III through XII are grossly intact no sensory deficit no cerebellar dysfunction   Psychiatric: Anxious          DIAGNOSTIC STUDIES / PROCEDURES    LABS  Results for orders placed or performed during the hospital encounter of 11/08/23   CBC WITH DIFFERENTIAL   Result Value Ref Range    WBC 7.0 4.8 - 10.8 K/uL    RBC 5.12 4.20 - 5.40 M/uL    Hemoglobin 8.1 (L) 12.0 - 16.0 g/dL    Hematocrit 31.1 (L) 37.0 - 47.0 %    MCV 60.7 (L) 81.4 - 97.8 fL    MCH 15.8 (L) 27.0 - 33.0 pg    MCHC 26.0 (L) 32.2 - 35.5 g/dL    RDW 41.2 35.9 - 50.0 fL    Platelet Count 456 (H) 164 - 446 K/uL    MPV 10.1 9.0 - 12.9 fL    " Neutrophils-Polys 55.50 44.00 - 72.00 %    Lymphocytes 28.10 22.00 - 41.00 %    Monocytes 13.80 (H) 0.00 - 13.40 %    Eosinophils 1.90 0.00 - 6.90 %    Basophils 0.40 0.00 - 1.80 %    Immature Granulocytes 0.30 0.00 - 0.90 %    Nucleated RBC 0.00 0.00 - 0.20 /100 WBC    Neutrophils (Absolute) 3.86 1.82 - 7.42 K/uL    Lymphs (Absolute) 1.95 1.00 - 4.80 K/uL    Monos (Absolute) 0.96 (H) 0.00 - 0.85 K/uL    Eos (Absolute) 0.13 0.00 - 0.51 K/uL    Baso (Absolute) 0.03 0.00 - 0.12 K/uL    Immature Granulocytes (abs) 0.02 0.00 - 0.11 K/uL    NRBC (Absolute) 0.00 K/uL    Hypochromia 1+     Microcytosis 3+ (A)    COMP METABOLIC PANEL   Result Value Ref Range    Sodium 137 135 - 145 mmol/L    Potassium 3.8 3.6 - 5.5 mmol/L    Chloride 105 96 - 112 mmol/L    Co2 21 20 - 33 mmol/L    Anion Gap 11.0 7.0 - 16.0    Glucose 128 (H) 65 - 99 mg/dL    Bun 11 8 - 22 mg/dL    Creatinine 0.68 0.50 - 1.40 mg/dL    Calcium 8.8 8.5 - 10.5 mg/dL    Correct Calcium 8.6 8.5 - 10.5 mg/dL    AST(SGOT) 20 12 - 45 U/L    ALT(SGPT) 12 2 - 50 U/L    Alkaline Phosphatase 71 30 - 99 U/L    Total Bilirubin 0.4 0.1 - 1.5 mg/dL    Albumin 4.2 3.2 - 4.9 g/dL    Total Protein 7.1 6.0 - 8.2 g/dL    Globulin 2.9 1.9 - 3.5 g/dL    A-G Ratio 1.4 g/dL   LIPASE   Result Value Ref Range    Lipase 34 11 - 82 U/L   HCG QUAL SERUM   Result Value Ref Range    Beta-Hcg Qualitative Serum Negative Negative   ESTIMATED GFR   Result Value Ref Range    GFR (CKD-EPI) 113 >60 mL/min/1.73 m 2   PLATELET ESTIMATE   Result Value Ref Range    Plt Estimation Increased    MORPHOLOGY   Result Value Ref Range    RBC Morphology Present     Poikilocytosis 1+     Ovalocytes 1+    PERIPHERAL SMEAR REVIEW   Result Value Ref Range    Peripheral Smear Review see below    DIFFERENTIAL COMMENT   Result Value Ref Range    Comments-Diff see below         RADIOLOGY  I have independently interpreted the diagnostic imaging associated with this visit and am waiting the final reading from the  radiologist.   My preliminary interpretation is as follows: Left ovarian cyst with normal echo suggesting against torsion  Radiologist interpretation:   US-PELVIC COMPLETE (TRANSABDOMINAL/TRANSVAGINAL) (COMBO)   Final Result      1.  Left ovarian 3.6 x 4.4 x 3.8 cm complex somewhat spongiform cystic lesion most consistent with hemorrhagic cyst.   2.  Left ovarian 1.8 x 1.8 x 1.6 cm complex partially cystic mostly solid lesion likely representing an involuting or hemorrhagic ovarian cyst.   3.  No free fluid.          COURSE & MEDICAL DECISION MAKING    ED Observation Status? Yes; I am placing the patient in to an observation status due to a diagnostic uncertainty as well as therapeutic intensity. Patient placed in observation status at 10 AM, 11/8/2023.     Observation plan is as follows: Establish an IV, administer nausea and pain medication perform serial abdominal exams as well as extensive blood and ultrasound imaging studies.    Upon Reevaluation, the patient's condition has: Improved; and will be discharged.    Patient discharged from ED Observation status at 1222 (Time) 11/8 (Date).     INITIAL ASSESSMENT, COURSE AND PLAN  Care Narrative:     This is a very pleasant 38-year-old female who presents here with acute left lower quadrant discomfort waxing waning for the last few days.  Differential diagnosis was extensive but not exclusive of kidney stone, ovarian cyst with or without torsion, ruptured ectopic pregnancy, pregnancy urinary tract infection diverticulitis.  An IV was established.  The patient had extensive evaluation.  Her vital signs were notably reassuring and normal no hypertension tachycardia hypotension hypoxia or fever.  Her CBC did not demonstrate any leukocytosis.  Of note the patient did have a hematocrit of 8.1.  Further history taking reveals that when the patient was in [place] she had a history of chronic anemia that did require blood transfusions.  She denies any gastrointestinal  bleeding such as hematemesis hematochezia or melena.  She denies any vaginal bleeding.  She reports that her levels are usually between 8 and 10.  Here her metabolic panel and serum pregnancy was unremarkable as well.  I thought that ultrasound would be the most useful initial test and did confirm left-sided ovarian cyst which fits with her clinical presentation.  There was a hemorrhagic component but no free fluid to suggest active extravasation of blood.  There is no evidence of torsion on ultrasound.  The patient did recently moved back to the area and will require urgent referral to gynecology with Dr. Sanchez.  I will give her some IV Toradol and oral Norco here.  I will prescribe some Zofran and Norco for breakthrough pain.  Return precautions have been reviewed.      ADDITIONAL PROBLEM LIST    DISPOSITION AND DISCUSSIONS  I have discussed management of the patient with the following physicians and DONI's: None    Discussion of management with other QHP or appropriate source(s): None    Escalation of care considered, and ultimately not performed: Considered admission and/or gynecological consultation    Barriers to care at this time, including but not limited to: None.     Decision tools and prescription drugs considered including, but not limited to: None.    FINAL DIAGNOSIS  1. Ovarian cyst, left    2. Chronic anemia         In prescribing controlled substances to this patient, I certify that I have obtained and reviewed the medical history of Mago Craven. I have also made a good benito effort to obtain applicable records from other providers who have treated the patient and records did not demonstrate any increased risk of substance abuse that would prevent me from prescribing controlled substances.     I have conducted a physical exam and documented it. I have reviewed Ms. Craven’s prescription history as maintained by the [place] Prescription Monitoring Program.     I have assessed the  patient’s risk for abuse, dependency, and addiction using the validated Opioid Risk Tool available at https://www.mdcalc.com/psgrmd-essw-outn-ort-narcotic-abuse.     Given the above, I believe the benefits of controlled substance therapy outweigh the risks. The reasons for prescribing controlled substances include non-narcotic, oral analgesic alternatives have been inadequate for pain control. Accordingly, I have discussed the risk and benefits, treatment plan, and alternative therapies with the patient.     Electronically signed by: Jarrell Louie M.D., 11/8/2023 10:06 AM

## 2023-11-08 NOTE — ED TRIAGE NOTES
"Chief Complaint   Patient presents with    Abdominal Pain     Pt c/o LLQ since yesterday. Pt states it feels like \"a pinch and like a balloon is in her abdomin.\"  Pt states the pain has been a constant 8/10 since yesterday. Pt denies N/V and fevers.       Pt ambulatory from lobby with steady gait.  Abdominal pain protocol ordered.     Pt is alert and oriented, speaking in full sentences, follows commands and responds appropriately to questions. Resp are even and unlabored.      Pt placed in lobby. Pt educated on triage process. Pt encouraged to alert staff for any changes.     Patient and staff wearing appropriate PPE.    /70   Pulse 83   Temp 36.8 °C (98.3 °F) (Temporal)   Resp 16   Ht 1.727 m (5' 8\")   Wt 75.5 kg (166 lb 7.2 oz)   SpO2 98%      "

## 2023-11-08 NOTE — DISCHARGE INSTRUCTIONS
Start taking an over-the-counter iron supplement as well as prenatal vitamins for your chronic anemia

## 2023-11-08 NOTE — ED NOTES
Pt provided with discharge teaching and information was discussed. Pt educated on the proper use of narcotics and signed the form for understanding of correct use. Pt questions answered. Pt verbalized understanding of importance of follow up with health care provider. Pt verbalized importance to  prescription medication from agreed pharmacy. Pt verbalized understanding of when to return if symptoms worsen. Pt ambulated out of the ED.

## 2023-11-08 NOTE — ED NOTES
Pt ambulated to the room with a steady gait   Pt changed into a gown  Chart up for an ERP  Pt resting in bed with call light within reach

## 2023-11-21 ENCOUNTER — OFFICE VISIT (OUTPATIENT)
Dept: MEDICAL GROUP | Facility: MEDICAL CENTER | Age: 38
End: 2023-11-21
Payer: COMMERCIAL

## 2023-11-21 VITALS
OXYGEN SATURATION: 99 % | SYSTOLIC BLOOD PRESSURE: 118 MMHG | HEART RATE: 84 BPM | TEMPERATURE: 98 F | RESPIRATION RATE: 20 BRPM | BODY MASS INDEX: 25.13 KG/M2 | HEIGHT: 68 IN | DIASTOLIC BLOOD PRESSURE: 76 MMHG | WEIGHT: 165.8 LBS

## 2023-11-21 DIAGNOSIS — N83.8 OVARIAN MASS, LEFT: ICD-10-CM

## 2023-11-21 DIAGNOSIS — M54.50 ACUTE BILATERAL LOW BACK PAIN WITHOUT SCIATICA: ICD-10-CM

## 2023-11-21 DIAGNOSIS — F41.1 GAD (GENERALIZED ANXIETY DISORDER): ICD-10-CM

## 2023-11-21 PROCEDURE — 99214 OFFICE O/P EST MOD 30 MIN: CPT | Performed by: PHYSICIAN ASSISTANT

## 2023-11-21 PROCEDURE — 3078F DIAST BP <80 MM HG: CPT | Performed by: PHYSICIAN ASSISTANT

## 2023-11-21 PROCEDURE — 3074F SYST BP LT 130 MM HG: CPT | Performed by: PHYSICIAN ASSISTANT

## 2023-11-21 RX ORDER — PREDNISONE 20 MG/1
TABLET ORAL
Qty: 10 TABLET | Refills: 0 | Status: SHIPPED | OUTPATIENT
Start: 2023-11-21

## 2023-11-21 RX ORDER — CYCLOBENZAPRINE HCL 5 MG
5 TABLET ORAL 2 TIMES DAILY PRN
Qty: 14 TABLET | Refills: 0 | Status: SHIPPED | OUTPATIENT
Start: 2023-11-21 | End: 2023-11-28

## 2023-11-21 ASSESSMENT — FIBROSIS 4 INDEX: FIB4 SCORE: 0.49

## 2023-11-21 NOTE — PROGRESS NOTES
"Subjective:   Mago Craven is a 38 y.o. female here today for     HPI: Patient is here to discuss:  Problem   Ovarian Mass, Left    Diagnosed in the ER with left ovarian mass. Has follow up with gynecology. On 12/5/23.            Current medicines (including changes today)  Current Outpatient Medications   Medication Sig Dispense Refill    cyclobenzaprine (FLEXERIL) 5 mg tablet Take 1 Tablet by mouth 2 times a day as needed for Muscle Spasms for up to 7 days. 14 Tablet 0    predniSONE (DELTASONE) 20 MG Tab Take one pill twice a day for five days 10 Tablet 0    sertraline (ZOLOFT) 50 MG Tab Take 1/2 pill daily for two weeks. Thereafter, take one pill daily in the morning. 30 Tablet 3    ondansetron (ZOFRAN ODT) 4 MG TABLET DISPERSIBLE Take 1 Tablet by mouth every 6 hours as needed for Nausea/Vomiting. 10 Tablet 0    ibuprofen (MOTRIN) 100 MG tablet Chew 4 Tablets every 8 hours as needed for Fever. 60 Tablet 0     No current facility-administered medications for this visit.     She  has a past medical history of Anemia, Anxiety, Depression, Head ache, and Herpes simplex type 1 infection.  Penicillins     Social History and Family History were reviewed and updated.    ROS   No headaches, chest pain, no shortness of breath, abdominal pain, nausea, or vomiting.  All other systems were reviewed and are negative or noted as positive in the HPI.       Objective:     /76   Pulse 84   Temp 36.7 °C (98 °F) (Temporal)   Resp 20   Ht 1.727 m (5' 8\")   Wt 75.2 kg (165 lb 12.8 oz)   SpO2 99%  Body mass index is 25.21 kg/m².     Physical Exam:  General: Patient appears well-nourished, well-hydrated, nontoxic  HEENT, normocephalic atraumatic, PERRLA, extraocular movements intact, nares are patent and clear  Neck: No visible masses, thyromegaly or abnormalities noted  Cardiovascular.  Sitting comfortably without visible signs of edema  Lungs: No cyanosis noted, nondyspneic  Skin: Well perfused without evidence " of rash or lesions  Neurological: Cranial nerves II through XII intact, normal gait  Musculoskeletal: Normal range of motion, normal strength and no deficit noted     Clinical Course/Lab Analysis:        Assessment and Plan:   The following treatment plan was discussed.  Signs and symptoms for which to return were discussed with patient at length.  Patient verbalized understanding.    Problem List Items Addressed This Visit       SANTA (generalized anxiety disorder)    Relevant Medications    sertraline (ZOLOFT) 50 MG Tab    Ovarian mass, left     New and unstable:  Will be following with gyn on 12/5/23          Other Visit Diagnoses       Acute bilateral low back pain without sciatica        Relevant Medications    cyclobenzaprine (FLEXERIL) 5 mg tablet    predniSONE (DELTASONE) 20 MG Tab           Patient brings in forms to complete for disability for work requesting November 8 through November 29 off.  I filled out the forms with her.  Please see scanned media.  She was seen in the emergency room for her abdominal pain and subsequent diagnosis of ovarian complex mass as well as did virtual visit for her back.  She was told she needed to come to primary care to get paperwork signed which I did.    Followup: 1 month    Please note that this dictation was created using voice recognition software. I have made every reasonable attempt to correct obvious errors, but I expect that there are errors of grammar and possibly content that I did not discover before finalizing the note.

## 2023-12-05 ENCOUNTER — OFFICE VISIT (OUTPATIENT)
Dept: OBGYN | Facility: CLINIC | Age: 38
End: 2023-12-05
Payer: COMMERCIAL

## 2023-12-05 ENCOUNTER — HOSPITAL ENCOUNTER (OUTPATIENT)
Facility: MEDICAL CENTER | Age: 38
End: 2023-12-05
Attending: OBSTETRICS & GYNECOLOGY
Payer: COMMERCIAL

## 2023-12-05 VITALS — BODY MASS INDEX: 25.7 KG/M2 | WEIGHT: 169 LBS

## 2023-12-05 DIAGNOSIS — Z86.19 HISTORY OF PCR DNA POSITIVE FOR HSV2: ICD-10-CM

## 2023-12-05 DIAGNOSIS — Z98.890 HISTORY OF LOOP ELECTRICAL EXCISION PROCEDURE (LEEP): ICD-10-CM

## 2023-12-05 DIAGNOSIS — N83.202 CYST OF LEFT OVARY: ICD-10-CM

## 2023-12-05 DIAGNOSIS — Z12.4 SCREENING FOR CERVICAL CANCER: ICD-10-CM

## 2023-12-05 PROCEDURE — 87491 CHLMYD TRACH DNA AMP PROBE: CPT

## 2023-12-05 PROCEDURE — 88175 CYTOPATH C/V AUTO FLUID REDO: CPT

## 2023-12-05 PROCEDURE — 99204 OFFICE O/P NEW MOD 45 MIN: CPT | Performed by: OBSTETRICS & GYNECOLOGY

## 2023-12-05 PROCEDURE — 87591 N.GONORRHOEAE DNA AMP PROB: CPT

## 2023-12-05 PROCEDURE — 87624 HPV HI-RISK TYP POOLED RSLT: CPT

## 2023-12-05 RX ORDER — VALACYCLOVIR HYDROCHLORIDE 1 G/1
1000 TABLET, FILM COATED ORAL 2 TIMES DAILY
Qty: 20 TABLET | Refills: 1 | Status: SHIPPED | OUTPATIENT
Start: 2023-12-05

## 2023-12-05 ASSESSMENT — ENCOUNTER SYMPTOMS
CARDIOVASCULAR NEGATIVE: 1
RESPIRATORY NEGATIVE: 1
GASTROINTESTINAL NEGATIVE: 1
CONSTITUTIONAL NEGATIVE: 1
EYES NEGATIVE: 1
MUSCULOSKELETAL NEGATIVE: 1
NEUROLOGICAL NEGATIVE: 1
PSYCHIATRIC NEGATIVE: 1

## 2023-12-05 ASSESSMENT — FIBROSIS 4 INDEX: FIB4 SCORE: 0.49

## 2023-12-05 NOTE — PROGRESS NOTES
NP here today for GYN appt  ER f/u  Was seen for LLQ px  Phone # [phone] (home)   C/o hot flashes and pelvic pain, light headed

## 2023-12-05 NOTE — PROGRESS NOTES
GYN PROBLEM VISIT    CC:  Gynecologic Exam    HPI: Patient is a 38 y.o.  who presents for follow up after acute onset of pelvic pain and the discovery of bilateral ovarian cysts. She reports that she had sudden onset pelvic pain on 2023 that lingered for several days. A pelvic ultrasound was performed and showed bilateral hemorrhagic cysts, the largest was on the left and measured 3.6 cm x 4.4 cm x 3.8 cm. She is not currently on contraception and she is feeling feeling better although not completely improved since the day she had presented to the ED. We discussed that cysts can be physiologic and discussed that they will likely resolve on their own. We discussed that OCPs can help prevent ovulation which can help decrease the formation of cysts. She declines at this time.     She also has a history of a LEEP two years ago and did not follow up for cervical cancer screening afterwards. She is agreeable to a pap smear today. She has not had the HPV vaccine and would like it. Our clinic is currently out of stock. It was requested that she be contacted when it is back in stock so she can start the course.     She has a history of HSV. She has had one lifetime outbreak. She was prescribed valacyclovir in the event that she has prodromal symptoms.     She is considering attempting to conceive soon. We discussed timing sex with ovulation and discussed that at the age of 38 a referral to [facility] would be indicated if she is unsuccessful conceiving in 3-6 months. She is going to discuss with her partner and she is going to start a prenatal vitamin.      ROS:   Review of Systems   Constitutional: Negative.    HENT: Negative.     Eyes: Negative.    Respiratory: Negative.     Cardiovascular: Negative.    Gastrointestinal: Negative.    Genitourinary:         Pelvic pain (improved, but not completely resolved)   Musculoskeletal: Negative.    Skin: Negative.    Neurological: Negative.    Endo/Heme/Allergies: Negative.     Psychiatric/Behavioral: Negative.           PFSH:  I personally reviewed the past medical and surgical histories.     Social History     Tobacco Use    Smoking status: Some Days     Types: Cigarettes    Smokeless tobacco: Never   Vaping Use    Vaping Use: Never used   Substance Use Topics    Alcohol use: Yes     Comment: occ    Drug use: Yes     Types: Inhaled     Comment: occ marijuana       Social History     Substance and Sexual Activity   Sexual Activity Not on file        ALLERGIES / REACTIONS:  Allergies   Allergen Reactions    Penicillins Unspecified     Unknown to patient.                           PHYSICAL EXAMINATION:  Vital Signs:   Vitals:    12/05/23 0925   Weight: 169 lb     Body mass index is 25.7 kg/m².    Physical Exam  Vitals reviewed. Exam conducted with a chaperone present.   Constitutional:       Appearance: Normal appearance.   HENT:      Head: Normocephalic.   Eyes:      Extraocular Movements: Extraocular movements intact.      Pupils: Pupils are equal, round, and reactive to light.   Cardiovascular:      Rate and Rhythm: Normal rate.   Pulmonary:      Effort: Pulmonary effort is normal.   Abdominal:      General: Abdomen is flat.      Palpations: Abdomen is soft.   Genitourinary:     General: Normal vulva.      Exam position: Lithotomy position.      Vagina: Normal.      Cervix: Normal.      Uterus: Normal.       Adnexa: Right adnexa normal and left adnexa normal.      Comments: She had some very mild bleeding from her cervical os and reports that she is supposed to start her period today. Pap smear collected   Musculoskeletal:         General: Normal range of motion.      Cervical back: Normal range of motion.   Skin:     General: Skin is warm and dry.   Neurological:      General: No focal deficit present.      Mental Status: She is alert and oriented to person, place, and time.   Psychiatric:         Mood and Affect: Mood normal.         Behavior: Behavior normal.          ASSESSMENT AND  PLAN:  38 y.o.    1. Cyst of left ovary  - US-PELVIC COMPLETE (TRANSABDOMINAL/TRANSVAGINAL) (COMBO); Future    2. Screening for cervical cancer  - PAP+HPV+CT/NG [[id number]]    3. History of loop electrical excision procedure (LEEP)    4. History of PCR DNA positive for HSV2  - valacyclovir (VALTREX) 1 GM Tab; Take 1 Tablet by mouth 2 times a day.  Dispense: 20 Tablet; Refill: 1    Plan:  She was counseled on ovarian cysts and a repeat ultrasound was ordered for 3 months  Pap smear was collected, she does have a history of a LEEP 2 years ago  Valtrex was prescribed in case she has prodromal symptoms  She was counseled on trying to conception and it was recommended that she consider pregnancy in the near future. We discussed starting a prenatal vitamin and timing sex with ovulation if she does want to conceive  She desires the HPV vaccine. Our clinic is out of stock. It was requested that she be contacted when it is back in stock.   It was requested that she obtain the pathology report from the prior LEEP if possible   7.   Return to clinic as needed    At least 45 minutes were spent on chart review, counseling, coordinating future care, and documentation     Bhupinder Gonzalez M.D.  Obstetrics & Gynecology   [phone]  3:07 PM

## 2023-12-06 ENCOUNTER — TELEPHONE (OUTPATIENT)
Dept: OBGYN | Facility: CLINIC | Age: 38
End: 2023-12-06
Payer: COMMERCIAL

## 2023-12-06 NOTE — TELEPHONE ENCOUNTER
[facility]MT[facility] for scheduling  ----- Message from Bhupinder Gonzalez M.D. sent at 12/5/2023  3:06 PM PST -----  Regarding: Needs HPV vaccine  Hi, this patient needs the HPV vaccine but I heard we are out. Could she please be notified and scheduled for a nurse visit when it is back in stock?  Bhupinder Gonzalez M.D.

## 2023-12-08 LAB
C TRACH RRNA CVX QL NAA+PROBE: NEGATIVE
CYTOLOGIST CVX/VAG CYTO: NORMAL
CYTOLOGY CVX/VAG DOC CYTO: NORMAL
CYTOLOGY CVX/VAG DOC THIN PREP: NORMAL
HPV I/H RISK 4 DNA CVX QL PROBE+SIG AMP: NEGATIVE
N GONORRHOEA RRNA CVX QL NAA+PROBE: NEGATIVE
NOTE NL11727A: NORMAL
OTHER STN SPEC: NORMAL
QC REVIEWED BY NL11722A: NORMAL
STAT OF ADQ CVX/VAG CYTO-IMP: NORMAL

## 2024-01-16 ENCOUNTER — APPOINTMENT (OUTPATIENT)
Dept: MEDICAL GROUP | Facility: MEDICAL CENTER | Age: 39
End: 2024-01-16
Payer: COMMERCIAL

## 2024-03-01 ENCOUNTER — OFFICE VISIT (OUTPATIENT)
Dept: URGENT CARE | Facility: CLINIC | Age: 39
End: 2024-03-01
Payer: COMMERCIAL

## 2024-03-01 VITALS
HEIGHT: 67 IN | HEART RATE: 99 BPM | RESPIRATION RATE: 16 BRPM | WEIGHT: 166 LBS | SYSTOLIC BLOOD PRESSURE: 100 MMHG | DIASTOLIC BLOOD PRESSURE: 60 MMHG | OXYGEN SATURATION: 96 % | BODY MASS INDEX: 26.06 KG/M2 | TEMPERATURE: 98 F

## 2024-03-01 DIAGNOSIS — J06.9 VIRAL URI: ICD-10-CM

## 2024-03-01 DIAGNOSIS — J10.1 INFLUENZA A: ICD-10-CM

## 2024-03-01 LAB
FLUAV RNA SPEC QL NAA+PROBE: POSITIVE
FLUBV RNA SPEC QL NAA+PROBE: NEGATIVE
RSV RNA SPEC QL NAA+PROBE: NEGATIVE
SARS-COV-2 RNA RESP QL NAA+PROBE: NEGATIVE

## 2024-03-01 PROCEDURE — 3078F DIAST BP <80 MM HG: CPT | Performed by: PHYSICIAN ASSISTANT

## 2024-03-01 PROCEDURE — 0241U POCT CEPHEID COV-2, FLU A/B, RSV - PCR: CPT | Performed by: PHYSICIAN ASSISTANT

## 2024-03-01 PROCEDURE — 3074F SYST BP LT 130 MM HG: CPT | Performed by: PHYSICIAN ASSISTANT

## 2024-03-01 PROCEDURE — 99213 OFFICE O/P EST LOW 20 MIN: CPT | Performed by: PHYSICIAN ASSISTANT

## 2024-03-01 RX ORDER — OSELTAMIVIR PHOSPHATE 75 MG/1
75 CAPSULE ORAL 2 TIMES DAILY
Qty: 10 CAPSULE | Refills: 0 | Status: SHIPPED | OUTPATIENT
Start: 2024-03-01 | End: 2024-03-06

## 2024-03-01 RX ORDER — FLUCONAZOLE 150 MG/1
TABLET ORAL
COMMUNITY
Start: 2024-02-14

## 2024-03-01 ASSESSMENT — ENCOUNTER SYMPTOMS
VOMITING: 0
EYE DISCHARGE: 0
COUGH: 1
DIARRHEA: 1
HEADACHES: 0
EYE REDNESS: 0
SORE THROAT: 1
FEVER: 1
SHORTNESS OF BREATH: 1
NAUSEA: 0
MYALGIAS: 1

## 2024-03-01 ASSESSMENT — FIBROSIS 4 INDEX: FIB4 SCORE: 0.49

## 2024-03-01 NOTE — PROGRESS NOTES
Subjective     Johanna Craven is a 38 y.o. female who presents with Cough (SOB, body aches,diarrhea, x 2 days)            URI   This is a new problem. Episode onset: x 2 days ago. The problem has been unchanged. Maximum temperature: The patient reports a subjective fever. Associated symptoms include congestion, coughing, diarrhea, a plugged ear sensation and a sore throat. Pertinent negatives include no ear pain, headaches, nausea or vomiting. Treatments tried: OTC DayQuil.     The patient's significant other is sick with similar symptoms.    PMH:  has a past medical history of Anemia, Anxiety, Depression, Head ache, and Herpes simplex type 1 infection.  MEDS:   Current Outpatient Medications:     fluconazole (DIFLUCAN) 150 MG tablet, TAKE 1 TAB(S) ORALLY ONCE REPEAT IN 3 DAYS (Patient not taking: Reported on 3/1/2024), Disp: , Rfl:     valacyclovir (VALTREX) 1 GM Tab, Take 1 Tablet by mouth 2 times a day. (Patient not taking: Reported on 3/1/2024), Disp: 20 Tablet, Rfl: 1    predniSONE (DELTASONE) 20 MG Tab, Take one pill twice a day for five days (Patient not taking: Reported on 3/1/2024), Disp: 10 Tablet, Rfl: 0    sertraline (ZOLOFT) 50 MG Tab, Take 1/2 pill daily for two weeks. Thereafter, take one pill daily in the morning. (Patient not taking: Reported on 3/1/2024), Disp: 30 Tablet, Rfl: 3    ondansetron (ZOFRAN ODT) 4 MG TABLET DISPERSIBLE, Take 1 Tablet by mouth every 6 hours as needed for Nausea/Vomiting. (Patient not taking: Reported on 3/1/2024), Disp: 10 Tablet, Rfl: 0    ibuprofen (MOTRIN) 100 MG tablet, Chew 4 Tablets every 8 hours as needed for Fever. (Patient not taking: Reported on 3/1/2024), Disp: 60 Tablet, Rfl: 0  ALLERGIES:   Allergies   Allergen Reactions    Penicillins Unspecified     Unknown to patient.     SURGHX:   Past Surgical History:   Procedure Laterality Date    CHOLECYSTECTOMY  2011    GASTRIC BYPASS LAPAROSCOPIC  2006     SOCHX:  reports that she has been smoking cigarettes.  "She has never used smokeless tobacco. She reports current alcohol use. She reports current drug use. Drug: Inhaled.  FH: Family history was reviewed, no pertinent findings to report    Review of Systems   Constitutional:  Positive for fever (The patient reports a subjective fever.).   HENT:  Positive for congestion and sore throat. Negative for ear pain.    Eyes:  Negative for discharge and redness.   Respiratory:  Positive for cough and shortness of breath (The patient reports associated shortness of breath.).    Gastrointestinal:  Positive for diarrhea. Negative for nausea and vomiting.   Musculoskeletal:  Positive for myalgias.   Neurological:  Negative for headaches.              Objective     /60 (BP Location: Left arm, Patient Position: Sitting, BP Cuff Size: Adult)   Pulse 99   Temp 36.7 °C (98 °F) (Temporal)   Resp 16   Ht 1.702 m (5' 7\")   Wt 75.3 kg (166 lb)   SpO2 96%   BMI 26.00 kg/m²      Physical Exam  Constitutional:       General: She is not in acute distress.     Appearance: Normal appearance. She is not ill-appearing.   HENT:      Head: Normocephalic and atraumatic.      Right Ear: Tympanic membrane, ear canal and external ear normal.      Left Ear: Tympanic membrane, ear canal and external ear normal.      Nose: Nose normal.      Mouth/Throat:      Mouth: Mucous membranes are moist.      Pharynx: Oropharynx is clear. Posterior oropharyngeal erythema (mild) present.   Eyes:      Extraocular Movements: Extraocular movements intact.      Conjunctiva/sclera: Conjunctivae normal.   Cardiovascular:      Rate and Rhythm: Normal rate and regular rhythm.      Heart sounds: Normal heart sounds.   Pulmonary:      Effort: Pulmonary effort is normal. No respiratory distress.      Breath sounds: Normal breath sounds. No wheezing.   Musculoskeletal:         General: Normal range of motion.      Cervical back: Normal range of motion and neck supple.   Skin:     General: Skin is warm and dry. "   Neurological:      Mental Status: She is alert and oriented to person, place, and time.                  Progress:  Results for orders placed or performed in visit on 03/01/24   POCT CoV-2, Flu A/B, RSV by PCR   Result Value Ref Range    SARS-CoV-2 by PCR Negative Negative, Invalid    Influenza virus A RNA Positive (A) Negative, Invalid    Influenza virus B, PCR Negative Negative, Invalid    RSV, PCR Negative Negative, Invalid                Assessment & Plan        1. Viral URI  - POCT CoV-2, Flu A/B, RSV by PCR    2. Influenza A  - oseltamivir (TAMIFLU) 75 MG Cap; Take 1 Capsule by mouth 2 times a day for 5 days.  Dispense: 10 Capsule; Refill: 0    The patient's presenting symptoms and physical exam findings are consistent with a viral URI.  The patient's physical exam today in clinic was normal, with the exception of mild erythema to the posterior pharynx.  The patient had no tongue hypertrophy or exudates.  The patient's uvula was midline.  The patient is nontoxic and appears in no acute distress.  The patient's vital signs are stable and within normal limits.  She is afebrile today in clinic.  Discussed likely viral etiology with the patient.  The patient's POCT Cepheid viral testing today in clinic was positive for influenza A.  This is consistent with the patient's current illness.  Will prescribe the patient Tamiflu for her acute influenza illness.  Advised the patient to monitor for worsening signs or symptoms.  Recommend OTC medications and supportive care for symptomatic management.  Recommend patient follow-up with primary care as needed.  Discussed return precautions with the patient, and she verbalized understanding.    Differential diagnoses, supportive care, and indications for immediate follow-up discussed with patient.   Instructed to return to clinic or nearest emergency department for any change in condition, further concerns, or worsening of symptoms.    OTC Tylenol or Motrin for  fever/discomfort.  OTC cough/cold medication for symptomatic relief  OTC Supportive Care for Congestion - saline nasal spray or neti pot  Drink plenty of fluids  Follow-up with PCP  Return to clinic or go to the ED if symptoms worsen or fail to improve, or if the patient should develop worsening/increasing cough, congestion, ear pain, sore throat, shortness of breath, wheezing, chest pain, fever/chills, and/or any concerning symptoms.    Discussed plan with the patient, and she agrees to the above.     I personally reviewed prior external notes and test results pertinent to today's visit.  I have independently reviewed and interpreted all diagnostics ordered during this urgent care visit.     Please note that this dictation was created using voice recognition software. I have made every reasonable attempt to correct obvious errors, but I expect that there may be errors of grammar and possibly content that I did not discover before finalizing the note.     This note was electronically signed by Radha Olvera PA-C

## 2024-03-01 NOTE — LETTER
March 1, 2024         Patient: Mago Craven   YOB: 1985   Date of Visit: 3/1/2024           To Whom it May Concern:    Mago Craven was seen in my clinic on 3/1/2024. Please excuse her from work 3/1 and 3/2. She may return to work on 3/6/2024.    If you have any questions or concerns, please don't hesitate to call.        Sincerely,           Radha Olvera P.A.-C.  Electronically Signed

## 2024-04-17 ENCOUNTER — OFFICE VISIT (OUTPATIENT)
Dept: MEDICAL GROUP | Age: 39
End: 2024-04-17
Payer: COMMERCIAL

## 2024-04-17 ENCOUNTER — HOSPITAL ENCOUNTER (EMERGENCY)
Facility: MEDICAL CENTER | Age: 39
End: 2024-04-17
Attending: EMERGENCY MEDICINE
Payer: COMMERCIAL

## 2024-04-17 ENCOUNTER — APPOINTMENT (OUTPATIENT)
Dept: RADIOLOGY | Facility: MEDICAL CENTER | Age: 39
End: 2024-04-17
Attending: EMERGENCY MEDICINE
Payer: COMMERCIAL

## 2024-04-17 VITALS
RESPIRATION RATE: 16 BRPM | TEMPERATURE: 98 F | SYSTOLIC BLOOD PRESSURE: 118 MMHG | BODY MASS INDEX: 27 KG/M2 | WEIGHT: 172 LBS | HEIGHT: 67 IN | OXYGEN SATURATION: 98 % | HEART RATE: 71 BPM | DIASTOLIC BLOOD PRESSURE: 62 MMHG

## 2024-04-17 VITALS
HEART RATE: 70 BPM | WEIGHT: 172.84 LBS | TEMPERATURE: 98.6 F | OXYGEN SATURATION: 95 % | HEIGHT: 68 IN | DIASTOLIC BLOOD PRESSURE: 80 MMHG | BODY MASS INDEX: 26.2 KG/M2 | SYSTOLIC BLOOD PRESSURE: 120 MMHG | RESPIRATION RATE: 18 BRPM

## 2024-04-17 DIAGNOSIS — R10.30 LOWER ABDOMINAL PAIN: ICD-10-CM

## 2024-04-17 DIAGNOSIS — N83.202 CYST OF LEFT OVARY: Primary | ICD-10-CM

## 2024-04-17 DIAGNOSIS — R10.32 LLQ ABDOMINAL PAIN: ICD-10-CM

## 2024-04-17 DIAGNOSIS — D50.9 IRON DEFICIENCY ANEMIA, UNSPECIFIED IRON DEFICIENCY ANEMIA TYPE: ICD-10-CM

## 2024-04-17 DIAGNOSIS — N83.202 CYST OF LEFT OVARY: ICD-10-CM

## 2024-04-17 DIAGNOSIS — R10.2 PELVIC PAIN: ICD-10-CM

## 2024-04-17 DIAGNOSIS — F41.1 GAD (GENERALIZED ANXIETY DISORDER): ICD-10-CM

## 2024-04-17 LAB
ALBUMIN SERPL BCP-MCNC: 3.9 G/DL (ref 3.2–4.9)
ALBUMIN/GLOB SERPL: 1.3 G/DL
ALP SERPL-CCNC: 78 U/L (ref 30–99)
ALT SERPL-CCNC: 8 U/L (ref 2–50)
ANION GAP SERPL CALC-SCNC: 10 MMOL/L (ref 7–16)
ANISOCYTOSIS BLD QL SMEAR: ABNORMAL
AST SERPL-CCNC: 18 U/L (ref 12–45)
BACTERIA GENITAL QL WET PREP: NORMAL
BASOPHILS # BLD AUTO: 0.5 % (ref 0–1.8)
BASOPHILS # BLD: 0.05 K/UL (ref 0–0.12)
BILIRUB SERPL-MCNC: <0.2 MG/DL (ref 0.1–1.5)
BUN SERPL-MCNC: 15 MG/DL (ref 8–22)
BURR CELLS BLD QL SMEAR: NORMAL
CALCIUM ALBUM COR SERPL-MCNC: 8.7 MG/DL (ref 8.5–10.5)
CALCIUM SERPL-MCNC: 8.6 MG/DL (ref 8.4–10.2)
CHLORIDE SERPL-SCNC: 107 MMOL/L (ref 96–112)
CO2 SERPL-SCNC: 22 MMOL/L (ref 20–33)
CREAT SERPL-MCNC: 0.56 MG/DL (ref 0.5–1.4)
EOSINOPHIL # BLD AUTO: 0.21 K/UL (ref 0–0.51)
EOSINOPHIL NFR BLD: 2.2 % (ref 0–6.9)
ERYTHROCYTE [DISTWIDTH] IN BLOOD BY AUTOMATED COUNT: 42.3 FL (ref 35.9–50)
GFR SERPLBLD CREATININE-BSD FMLA CKD-EPI: 118 ML/MIN/1.73 M 2
GLOBULIN SER CALC-MCNC: 3 G/DL (ref 1.9–3.5)
GLUCOSE SERPL-MCNC: 71 MG/DL (ref 65–99)
HCG SERPL QL: NEGATIVE
HCT VFR BLD AUTO: 29 % (ref 37–47)
HGB BLD-MCNC: 7.6 G/DL (ref 12–16)
HYPOCHROMIA BLD QL SMEAR: ABNORMAL
IMM GRANULOCYTES # BLD AUTO: 0.03 K/UL (ref 0–0.11)
IMM GRANULOCYTES NFR BLD AUTO: 0.3 % (ref 0–0.9)
IRON SATN MFR SERPL: 3 % (ref 15–55)
IRON SERPL-MCNC: 13 UG/DL (ref 40–170)
LYMPHOCYTES # BLD AUTO: 2.53 K/UL (ref 1–4.8)
LYMPHOCYTES NFR BLD: 26.5 % (ref 22–41)
MCH RBC QN AUTO: 16.4 PG (ref 27–33)
MCHC RBC AUTO-ENTMCNC: 26.2 G/DL (ref 32.2–35.5)
MCV RBC AUTO: 62.5 FL (ref 81.4–97.8)
MICROCYTES BLD QL SMEAR: ABNORMAL
MONOCYTES # BLD AUTO: 1.16 K/UL (ref 0–0.85)
MONOCYTES NFR BLD AUTO: 12.2 % (ref 0–13.4)
NEUTROPHILS # BLD AUTO: 5.55 K/UL (ref 1.82–7.42)
NEUTROPHILS NFR BLD: 58.3 % (ref 44–72)
NRBC # BLD AUTO: 0 K/UL
NRBC BLD-RTO: 0 /100 WBC (ref 0–0.2)
OVALOCYTES BLD QL SMEAR: NORMAL
PLATELET # BLD AUTO: 410 K/UL (ref 164–446)
PLATELET BLD QL SMEAR: NORMAL
PMV BLD AUTO: 10.3 FL (ref 9–12.9)
POLYCHROMASIA BLD QL SMEAR: NORMAL
POTASSIUM SERPL-SCNC: 4.2 MMOL/L (ref 3.6–5.5)
PROT SERPL-MCNC: 6.9 G/DL (ref 6–8.2)
RBC # BLD AUTO: 4.64 M/UL (ref 4.2–5.4)
RBC BLD AUTO: PRESENT
SIGNIFICANT IND 70042: NORMAL
SITE SITE: NORMAL
SODIUM SERPL-SCNC: 139 MMOL/L (ref 135–145)
SOURCE SOURCE: NORMAL
TIBC SERPL-MCNC: 472 UG/DL (ref 250–450)
UIBC SERPL-MCNC: 459 UG/DL (ref 110–370)
WBC # BLD AUTO: 9.5 K/UL (ref 4.8–10.8)

## 2024-04-17 PROCEDURE — 36415 COLL VENOUS BLD VENIPUNCTURE: CPT

## 2024-04-17 PROCEDURE — 76830 TRANSVAGINAL US NON-OB: CPT

## 2024-04-17 PROCEDURE — 3074F SYST BP LT 130 MM HG: CPT | Performed by: PHYSICIAN ASSISTANT

## 2024-04-17 PROCEDURE — 96375 TX/PRO/DX INJ NEW DRUG ADDON: CPT

## 2024-04-17 PROCEDURE — 96374 THER/PROPH/DIAG INJ IV PUSH: CPT

## 2024-04-17 PROCEDURE — 82728 ASSAY OF FERRITIN: CPT

## 2024-04-17 PROCEDURE — 83550 IRON BINDING TEST: CPT

## 2024-04-17 PROCEDURE — 85025 COMPLETE CBC W/AUTO DIFF WBC: CPT

## 2024-04-17 PROCEDURE — 83540 ASSAY OF IRON: CPT

## 2024-04-17 PROCEDURE — 700117 HCHG RX CONTRAST REV CODE 255: Performed by: EMERGENCY MEDICINE

## 2024-04-17 PROCEDURE — 84703 CHORIONIC GONADOTROPIN ASSAY: CPT

## 2024-04-17 PROCEDURE — 3078F DIAST BP <80 MM HG: CPT | Performed by: PHYSICIAN ASSISTANT

## 2024-04-17 PROCEDURE — 74177 CT ABD & PELVIS W/CONTRAST: CPT

## 2024-04-17 PROCEDURE — 99215 OFFICE O/P EST HI 40 MIN: CPT | Performed by: PHYSICIAN ASSISTANT

## 2024-04-17 PROCEDURE — 700111 HCHG RX REV CODE 636 W/ 250 OVERRIDE (IP): Performed by: EMERGENCY MEDICINE

## 2024-04-17 PROCEDURE — 84466 ASSAY OF TRANSFERRIN: CPT

## 2024-04-17 PROCEDURE — 80053 COMPREHEN METABOLIC PANEL: CPT

## 2024-04-17 PROCEDURE — 99284 EMERGENCY DEPT VISIT MOD MDM: CPT

## 2024-04-17 RX ORDER — ONDANSETRON 2 MG/ML
4 INJECTION INTRAMUSCULAR; INTRAVENOUS ONCE
Status: COMPLETED | OUTPATIENT
Start: 2024-04-17 | End: 2024-04-17

## 2024-04-17 RX ORDER — FERROUS SULFATE 220 (44)/5
440 ELIXIR ORAL DAILY
Qty: 473 ML | Refills: 1 | Status: SHIPPED | OUTPATIENT
Start: 2024-04-17 | End: 2024-07-21

## 2024-04-17 RX ORDER — KETOROLAC TROMETHAMINE 15 MG/ML
15 INJECTION, SOLUTION INTRAMUSCULAR; INTRAVENOUS ONCE
Status: COMPLETED | OUTPATIENT
Start: 2024-04-17 | End: 2024-04-17

## 2024-04-17 RX ORDER — FERROUS SULFATE 325(65) MG
325 TABLET ORAL DAILY
Qty: 30 TABLET | Refills: 1 | Status: SHIPPED | OUTPATIENT
Start: 2024-04-17 | End: 2024-04-17

## 2024-04-17 RX ADMIN — ONDANSETRON 4 MG: 2 INJECTION INTRAMUSCULAR; INTRAVENOUS at 19:58

## 2024-04-17 RX ADMIN — IOHEXOL 100 ML: 350 INJECTION, SOLUTION INTRAVENOUS at 22:33

## 2024-04-17 RX ADMIN — KETOROLAC TROMETHAMINE 15 MG: 15 INJECTION, SOLUTION INTRAMUSCULAR; INTRAVENOUS at 19:58

## 2024-04-17 ASSESSMENT — PATIENT HEALTH QUESTIONNAIRE - PHQ9
5. POOR APPETITE OR OVEREATING: 2 - MORE THAN HALF THE DAYS
CLINICAL INTERPRETATION OF PHQ2 SCORE: 6
SUM OF ALL RESPONSES TO PHQ QUESTIONS 1-9: 20

## 2024-04-17 ASSESSMENT — FIBROSIS 4 INDEX
FIB4 SCORE: 0.49
FIB4 SCORE: 0.49

## 2024-04-17 NOTE — LETTER
April 17, 2024       Patient: Mago Craven   YOB: 1985   Date of Visit: 4/17/2024         To Whom It May Concern:    Please excuse Mago Craven from work 4/17/2024-4/19/2024 due to illness.    If you have any questions or concerns, please don't hesitate to call [phone]          Sincerely,          Mayra Kaur P.A.-C.

## 2024-04-17 NOTE — PROGRESS NOTES
"cc: Lower abdominal pain    Subjective:     HPI  PCP-Rosa Branch PA-C, unable to see today  Mago Craven is a 38 y.o. female with history of ovarian cysts presents with concerns of worsening left lower and right lower quadrant pain.  She has established with gynecology, they are monitoring ovarian cyst, was advised to have repeat pelvic ultrasound in 3 months from initial ultrasound 11/2023, does have pelvic ultrasound scheduled in May.  She states that the pain has progressively been worsening over the past 2 weeks, initially was on the left, now on the right.  Rates it 7/10, does increase in intensity though to the point where she is bent over in pain, has almost passed out due to the pain.  She is now experiencing pain on the right lower quadrant as well.  She does have menstrual regularly days, did have intercourse about 2 days ago, causing pain, also had postcoital bleeding.  She has not had a bowel movement in 4 days.  Does feel intermittently nauseated.  Denies fever    Pelvic ultrasound 11/2023  The left ovary measures 5.45 cm x 3.85 cm x 5.49 cm. Duplex Doppler examination of the left ovary shows normal waveforms. 3.6 x 4.4 x 3.8 cm complex left ovarian mass with somewhat spongiform architecture. 1.8 x 1.8 x 1.6 cm complex cystic left ovarian  mass.    Review of systems:  See above.     No current outpatient medications on file.    Allergies, past medical history, past surgical history, family history, social history reviewed and updated    Objective:     Vitals: /62 (BP Location: Right arm, Patient Position: Sitting, BP Cuff Size: Adult)   Pulse 71   Temp 36.7 °C (98 °F) (Temporal)   Resp 16   Ht 1.702 m (5' 7\")   Wt 78 kg (172 lb)   SpO2 98%   BMI 26.94 kg/m²   General: Alert, pleasant, NAD  HEENT: Normocephalic. Neck supple.  No thyromegaly or masses palpated. No cervical or supraclavicular lymphadenopathy. No carotid bruits   Heart: Regular rate and rhythm.  S1 and S2 normal. "  No murmurs appreciated.  Respiratory: Normal respiratory effort.  Clear to auscultation bilaterally.  Abdomen:  Normoactive bowel sounds.  Non-distended, soft, moderate left lower quadrant pain, mild right lower quadrant pain.  No guarding/rebound. No hepatosplenomegaly.  No hernias.  Positive McBurney's    Skin: Warm, dry, no rashes.  Extremities: No leg edema.  Radial pulses 2+ symmetric  Psych:  Affect/mood is normal, judgement is good, memory is intact, grooming is appropriate.    Assessment/Plan:     Johanna was seen today for letter for school/work.    Diagnoses and all orders for this visit:    Pelvic pain  Possibly multifactorial.  However, she does have known ovarian cyst, fairly large on recent imaging, due to the amount of pain that she is in, almost causing syncope.  Cannot completely exclude out ovarian torsion, ruptured ovarian cyst, has fairly severe left lower quadrant pain, possible diverticulitis, as she has not had a bowel movement in 4 days, although afebrile.  Also having right lower quadrant pain, did have positive McBurney's, however could be attributed to another ovarian cyst nausea constipation.  Due to these concerns recommend more emergent workup, advised to go to the ER.  She is amicable, her boyfriend will drive her there now.  She is requesting work note, given in clinic today.    Cyst of left ovary  See above    Lower abdominal pain  See above    SANTA (generalized anxiety disorder)  -Did have a positive depression screening, she does have fairly severe anxiety.  Advised to follow-up with her PCP to discuss management  -     Patient has been identified as having a positive depression screening. Appropriate orders and counseling have been given.        Return in about 1 week (around 4/24/2024) for With PCP-ER f/u.

## 2024-04-18 LAB
FERRITIN SERPL-MCNC: 2.2 NG/ML (ref 10–291)
TRANSFERRIN SERPL-MCNC: 405 MG/DL (ref 200–370)

## 2024-04-18 NOTE — DISCHARGE INSTRUCTIONS
No drinking or driving on [place].  Use Tylenol or ibuprofen you can use children's liquid for pain if you are having severe pain you can use the Hycet.  Return emergency department if you have  severe worsening of pain, heavy vaginal bleeding, fever or severe vomiting.  You need to follow-up with gynecology for a Pap smear

## 2024-04-18 NOTE — ED TRIAGE NOTES
"Pt comes in w/ SO  c/o pelvic pain since being Dx w/ L ovarian cyst in Oct 2023  is to be re-evaluated by GYN in 3 months however has been in pain since    the last couple of days and today pain has worsened she also c/o Nausea and near vomited   can not tolerate pain any longer  \"feels like it's getting bigger\"  has feeling in fulness to pelvic area  denies pregnancy   "

## 2024-04-18 NOTE — ED PROVIDER NOTES
ER Provider Note    Scribed for Mateo Steward M.D. by Flora Cortes. 4/17/2024  7:16 PM    Primary Care Provider: Rosa Branch P.A.-C.    CHIEF COMPLAINT   Chief Complaint   Patient presents with    Pelvic Pain     Started about 2 weeks ago   pain getting worse    recent Dx of cyst to L ovary  was to be evaluation in 3 months  this pain has been ongoing since Oct 2023  pain worse today      N/V     Intermitting N/V   today worse      EXTERNAL RECORDS REVIEWED  Care everywhere The patient was seen at Urgent Care today and was sent here for further workup. She had an ultrasound in October and was recommended to follow up in 3 months for ovarian cyst and severe dyspareunia.     HPI/ROS  LIMITATION TO HISTORY   Select: : None  OUTSIDE HISTORIAN(S):  None    Mago Craven is a 38 y.o. female who presents to the ED complaining of pelvic pain onset 2 weeks ago. She states that in October she was diagnosed with an ovarian cyst to the left ovary but she now feels like it is being pulled or twisted and the pain is now radiating to her left side. The patient states that the pain is constant and she has not had a day where she has not felt pain since October. She notes that the pain does not worsen around her menstrual cycle but her cycles have been irregular for the past two years, noting that she often does not bleed very much. Her LMP was a few days ago. She denies any chance of pregnancy. The patient denies any changes to vaginal bleeding or vaginal discharge, history of STI or STDs, or any new sexual partners. She has a history of herpes but has not had any recent outbreaks. The patient states that she has anxiety and is unable to swallow pills due to this so she has not been taking any medications for the pain.      PAST MEDICAL HISTORY  Past Medical History:   Diagnosis Date    Anemia     hx of gastric surgery    Anxiety     Depression     Head ache     Herpes simplex type 1 infection   "      SURGICAL HISTORY  Past Surgical History:   Procedure Laterality Date    CHOLECYSTECTOMY  2011    GASTRIC BYPASS LAPAROSCOPIC  2006       FAMILY HISTORY  Family History   Problem Relation Age of Onset    Ovarian Cancer Mother        SOCIAL HISTORY   reports that she has been smoking cigarettes. She has never used smokeless tobacco. She reports current alcohol use. She reports current drug use. Drug: Inhaled.    CURRENT MEDICATIONS  Discharge Medication List as of 4/17/2024 11:38 PM          ALLERGIES  Penicillins    PHYSICAL EXAM  /75   Pulse (!) 107   Temp 37.1 °C (98.8 °F) (Temporal)   Resp 18   Ht 1.727 m (5' 8\")   Wt 78.4 kg (172 lb 13.5 oz)   LMP 03/20/2024   SpO2 99%   BMI 26.28 kg/m²   Constitutional: Well developed, Well nourished, mild distress.   HENT: Normocephalic, Atraumatic.   Eyes: Conjunctiva normal, No discharge.   Cardiovascular: Normal heart rate, Normal rhythm, No murmurs, equal pulses.   Pulmonary: Normal breath sounds, No respiratory distress, No wheezing, No rales, No rhonchi.  Chest: No chest wall tenderness or deformity.   Abdomen: Tenderness to left lower quadrant and slightly to right lower quadrant, Slight guarding on the left pelvic region, Rest of abdomen is soft and nontender.   Back: No CVA tenderness.   Musculoskeletal: No major deformities noted, No tenderness.   Skin: Warm, Dry, No erythema, No rash.   Neurologic: Alert & oriented x 3, Normal motor function,  No focal deficits noted.   Psychiatric: Affect normal, Judgment normal, Mood normal.    Pelvic: 0.5 cm by 0.5 cm tissue growth protruding from the cervical opening, At 4 o'clock there are 2 spots on the cervix, Tenderness to the left adnexa    DIAGNOSTIC STUDIES    EKG/LABS  Results for orders placed or performed during the hospital encounter of 04/17/24   CBC WITH DIFFERENTIAL   Result Value Ref Range    WBC 9.5 4.8 - 10.8 K/uL    RBC 4.64 4.20 - 5.40 M/uL    Hemoglobin 7.6 (L) 12.0 - 16.0 g/dL    " Hematocrit 29.0 (L) 37.0 - 47.0 %    MCV 62.5 (L) 81.4 - 97.8 fL    MCH 16.4 (L) 27.0 - 33.0 pg    MCHC 26.2 (L) 32.2 - 35.5 g/dL    RDW 42.3 35.9 - 50.0 fL    Platelet Count 410 164 - 446 K/uL    MPV 10.3 9.0 - 12.9 fL    Neutrophils-Polys 58.30 44.00 - 72.00 %    Lymphocytes 26.50 22.00 - 41.00 %    Monocytes 12.20 0.00 - 13.40 %    Eosinophils 2.20 0.00 - 6.90 %    Basophils 0.50 0.00 - 1.80 %    Immature Granulocytes 0.30 0.00 - 0.90 %    Nucleated RBC 0.00 0.00 - 0.20 /100 WBC    Neutrophils (Absolute) 5.55 1.82 - 7.42 K/uL    Lymphs (Absolute) 2.53 1.00 - 4.80 K/uL    Monos (Absolute) 1.16 (H) 0.00 - 0.85 K/uL    Eos (Absolute) 0.21 0.00 - 0.51 K/uL    Baso (Absolute) 0.05 0.00 - 0.12 K/uL    Immature Granulocytes (abs) 0.03 0.00 - 0.11 K/uL    NRBC (Absolute) 0.00 K/uL    Hypochromia 1+     Anisocytosis 1+     Microcytosis 2+ (A)    WET PREP    Specimen: Vaginal; Genital   Result Value Ref Range    Significant Indicator NEG     Source GEN     Site VAGINAL     Wet Prep For Parasites       No yeast.  No motile Trichomonas seen.  No clue cells seen.     Comp Metabolic Panel   Result Value Ref Range    Sodium 139 135 - 145 mmol/L    Potassium 4.2 3.6 - 5.5 mmol/L    Chloride 107 96 - 112 mmol/L    Co2 22 20 - 33 mmol/L    Anion Gap 10.0 7.0 - 16.0    Glucose 71 65 - 99 mg/dL    Bun 15 8 - 22 mg/dL    Creatinine 0.56 0.50 - 1.40 mg/dL    Calcium 8.6 8.4 - 10.2 mg/dL    Correct Calcium 8.7 8.5 - 10.5 mg/dL    AST(SGOT) 18 12 - 45 U/L    ALT(SGPT) 8 2 - 50 U/L    Alkaline Phosphatase 78 30 - 99 U/L    Total Bilirubin <0.2 0.1 - 1.5 mg/dL    Albumin 3.9 3.2 - 4.9 g/dL    Total Protein 6.9 6.0 - 8.2 g/dL    Globulin 3.0 1.9 - 3.5 g/dL    A-G Ratio 1.3 g/dL   HCG QUAL SERUM   Result Value Ref Range    Beta-Hcg Qualitative Serum Negative Negative   PLATELET ESTIMATE   Result Value Ref Range    Plt Estimation Normal    MORPHOLOGY   Result Value Ref Range    RBC Morphology Present     Polychromia 1+     Ovalocytes 1+      Echinocytes 1+    ESTIMATED GFR   Result Value Ref Range    GFR (CKD-EPI) 118 >60 mL/min/1.73 m 2   IRON/TOTAL IRON BIND   Result Value Ref Range    Iron 13 (L) 40 - 170 ug/dL    Total Iron Binding 472 (H) 250 - 450 ug/dL    Unsat Iron Binding 459 (H) 110 - 370 ug/dL    % Saturation 3 (L) 15 - 55 %     RADIOLOGY/PROCEDURES   The attending emergency physician has independently interpreted the diagnostic imaging associated with this visit and am waiting the final reading from the radiologist.   My preliminary interpretation is a follows: Ultrasound shows small ovarian cyst    Radiologist interpretation:  CT-ABDOMEN-PELVIS WITH   Final Result         1.  Enhancing left ovarian cyst, appearance most typical of involuting cyst.   2.  Mild hepatomegaly      US-PELVIC COMPLETE (TRANSABDOMINAL/TRANSVAGINAL) (COMBO)   Final Result         1.  Anechoic lesions in the left ovary, appearance favoring small follicular cyst.   2.  Otherwise unremarkable pelvic sonogram.          COURSE & MEDICAL DECISION MAKING     ASSESSMENT, COURSE AND PLAN  Care Narrative:   7:24 PM - Patient seen and examined at bedside. The patient presents with pelvic pain. Discussed plan of care, including imaging and reassessment. The patient verbalizes understanding with the plan of care. The patient will be medicated with Toradol 15 mg and Zofran 4 mg. Ordered for US-Transvaginal, HCG Qual, CBC w/ diff, Urinalysis, Wet Prep, and Chlamydia/GC to evaluate her symptoms. The patient was given an opportunity to ask questions. Differential diagnoses include but not limited to: Ovarian cyst, ovarian torsion, vs UTI.      8:41 PM Recheck: Patient re-evaluated at beside. Patient's lab and radiology results discussed. I informed the patient that she may be anemic. She denies any blood in her stool and reports her menstrual cycles are very light. Discussed patient's condition and treatment plan. The patient understood and is in agreement.      9:20 PM Recheck:  Patient re-evaluated at beside. Discussed patient's condition and treatment plan. The patient understood and is in agreement.  Pelvic exam performed with female chaperone  in the room. I informed the patient that she should follow up with her gynecologist, Dr. Gonzalez, for the cervical growth. Ordered for CT-Abdomen-Pelvis w/ to evaluate left adnexal tenderness.     Narcotics Script:In prescribing controlled substances to this patient, I certify that I have obtained and reviewed the medical history of Mago Craven. I have also made a good benito effort to obtain applicable records from other providers who have treated the patient and records did not demonstrate any increased risk of substance abuse that would prevent me from prescribing controlled substances.     I have conducted a physical exam and documented it. I have reviewed Ms. Craven’s prescription history as maintained by the [place] Prescription Monitoring Program.     I have assessed the patient’s risk for abuse, dependency, and addiction using the validated Opioid Risk Tool available at https://www.mdcalc.com/uhhrbe-wphc-kqvl-ort-narcotic-abuse.     Given the above, I believe the benefits of controlled substance therapy outweigh the risks. The reasons for prescribing controlled substances include non-narcotic, oral analgesic alternatives have been inadequate for pain control. Accordingly, I have discussed the risk and benefits, treatment plan, and alternative therapies with the patient.         PROBLEM LIST  Problem 1 left pelvic pain at this point time I think this is secondary to an ovarian cyst.  CT of the abdomen pelvis as well as ultrasound was obtained this does not show any ovarian torsion or other mass or cause for the patient's pelvic pain.  Patient did have some abnormal tissue on pelvic exam protruding from her cervix I have discussed with her this needs to be followed up by her gynecologist to make sure this is not cancerous.  She  understands this.    Problem #2 anemia at this point time this looks to be secondary to iron deficiency.  Will start the patient on iron supplementation she does not warrant a transfusion at this point in time she is still above 7.    DISPOSITION AND DISCUSSIONS  I have discussed management of the patient with the following physicians and DONI's: None    Discussion of management with other Q or appropriate source(s): None     Decision tools and prescription drugs considered including, but not limited to: Pain Medications Hycet .    FINAL DIANGOSIS  1. Cyst of left ovary    2. LLQ abdominal pain    3. Iron deficiency anemia, unspecified iron deficiency anemia type            I, Flora Cortes (Scribe), am scribing for, and in the presence of, Mateo Steward M.D.    Electronically signed by: Flora Cortes (Scribe), 4/17/2024    IMateo M.D personally performed the services described in this documentation, as scribed by Flora Cortes in my presence, and it is both accurate and complete.   The note accurately reflects work and decisions made by me.  Mateo Steward M.D.  4/18/2024  12:56 AM

## 2024-04-30 ENCOUNTER — OFFICE VISIT (OUTPATIENT)
Dept: MEDICAL GROUP | Facility: MEDICAL CENTER | Age: 39
End: 2024-04-30
Payer: COMMERCIAL

## 2024-04-30 VITALS
DIASTOLIC BLOOD PRESSURE: 76 MMHG | HEART RATE: 76 BPM | SYSTOLIC BLOOD PRESSURE: 108 MMHG | BODY MASS INDEX: 25.76 KG/M2 | OXYGEN SATURATION: 100 % | RESPIRATION RATE: 14 BRPM | HEIGHT: 68 IN | TEMPERATURE: 98.6 F | WEIGHT: 170 LBS

## 2024-04-30 DIAGNOSIS — F43.10 PTSD (POST-TRAUMATIC STRESS DISORDER): ICD-10-CM

## 2024-04-30 DIAGNOSIS — Z02.89 ENCOUNTER FOR COMPLETION OF FORM WITH PATIENT: ICD-10-CM

## 2024-04-30 DIAGNOSIS — F41.1 GAD (GENERALIZED ANXIETY DISORDER): ICD-10-CM

## 2024-04-30 DIAGNOSIS — N83.8 OVARIAN MASS, LEFT: ICD-10-CM

## 2024-04-30 RX ORDER — SERTRALINE HYDROCHLORIDE 25 MG/1
25 TABLET, FILM COATED ORAL DAILY
Qty: 30 TABLET | Refills: 5 | Status: SHIPPED | OUTPATIENT
Start: 2024-04-30

## 2024-04-30 ASSESSMENT — FIBROSIS 4 INDEX: FIB4 SCORE: 0.62
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
## 2024-05-06 ENCOUNTER — APPOINTMENT (OUTPATIENT)
Dept: RADIOLOGY | Facility: MEDICAL CENTER | Age: 39
End: 2024-05-06
Attending: OBSTETRICS & GYNECOLOGY
Payer: COMMERCIAL

## 2024-05-23 ENCOUNTER — APPOINTMENT (OUTPATIENT)
Dept: RADIOLOGY | Facility: MEDICAL CENTER | Age: 39
End: 2024-05-23
Attending: OBSTETRICS & GYNECOLOGY
Payer: COMMERCIAL

## 2024-06-20 ENCOUNTER — APPOINTMENT (OUTPATIENT)
Dept: RADIOLOGY | Facility: MEDICAL CENTER | Age: 39
End: 2024-06-20
Attending: OBSTETRICS & GYNECOLOGY
Payer: COMMERCIAL

## 2024-07-15 ENCOUNTER — APPOINTMENT (OUTPATIENT)
Dept: RADIOLOGY | Facility: MEDICAL CENTER | Age: 39
End: 2024-07-15
Attending: OBSTETRICS & GYNECOLOGY
Payer: COMMERCIAL

## 2024-07-24 ENCOUNTER — APPOINTMENT (OUTPATIENT)
Dept: BEHAVIORAL HEALTH | Facility: CLINIC | Age: 39
End: 2024-07-24
Payer: COMMERCIAL

## 2024-08-01 ENCOUNTER — APPOINTMENT (OUTPATIENT)
Dept: MEDICAL GROUP | Facility: MEDICAL CENTER | Age: 39
End: 2024-08-01
Payer: COMMERCIAL

## 2024-10-03 ENCOUNTER — APPOINTMENT (OUTPATIENT)
Dept: BEHAVIORAL HEALTH | Facility: CLINIC | Age: 39
End: 2024-10-03
Payer: COMMERCIAL

## 2024-10-03 VITALS — BODY MASS INDEX: 26.22 KG/M2 | WEIGHT: 173 LBS | HEIGHT: 68 IN

## 2024-10-03 DIAGNOSIS — F33.1 MDD (MAJOR DEPRESSIVE DISORDER), RECURRENT EPISODE, MODERATE (HCC): ICD-10-CM

## 2024-10-03 DIAGNOSIS — F43.10 PTSD (POST-TRAUMATIC STRESS DISORDER): ICD-10-CM

## 2024-10-03 DIAGNOSIS — R41.840 ATTENTION AND CONCENTRATION DEFICIT: ICD-10-CM

## 2024-10-03 PROBLEM — Z02.89 ENCOUNTER FOR COMPLETION OF FORM WITH PATIENT: Status: RESOLVED | Noted: 2024-04-30 | Resolved: 2024-10-03

## 2024-10-03 PROBLEM — F41.1 GAD (GENERALIZED ANXIETY DISORDER): Status: RESOLVED | Noted: 2021-12-09 | Resolved: 2024-10-03

## 2024-10-03 PROCEDURE — 99205 OFFICE O/P NEW HI 60 MIN: CPT | Performed by: PSYCHIATRY & NEUROLOGY

## 2024-10-03 PROCEDURE — 96127 BRIEF EMOTIONAL/BEHAV ASSMT: CPT | Performed by: PSYCHIATRY & NEUROLOGY

## 2024-10-03 RX ORDER — ESCITALOPRAM OXALATE 10 MG/1
TABLET ORAL
Qty: 53 TABLET | Refills: 0 | Status: SHIPPED | OUTPATIENT
Start: 2024-10-03 | End: 2024-12-02

## 2024-10-03 ASSESSMENT — ANXIETY QUESTIONNAIRES
3. WORRYING TOO MUCH ABOUT DIFFERENT THINGS: NEARLY EVERY DAY
1. FEELING NERVOUS, ANXIOUS, OR ON EDGE: NEARLY EVERY DAY
IF YOU CHECKED OFF ANY PROBLEMS ON THIS QUESTIONNAIRE, HOW DIFFICULT HAVE THESE PROBLEMS MADE IT FOR YOU TO DO YOUR WORK, TAKE CARE OF THINGS AT HOME, OR GET ALONG WITH OTHER PEOPLE: EXTREMELY DIFFICULT
2. NOT BEING ABLE TO STOP OR CONTROL WORRYING: NEARLY EVERY DAY
4. TROUBLE RELAXING: NEARLY EVERY DAY
7. FEELING AFRAID AS IF SOMETHING AWFUL MIGHT HAPPEN: NEARLY EVERY DAY
GAD7 TOTAL SCORE: 20
5. BEING SO RESTLESS THAT IT IS HARD TO SIT STILL: MORE THAN HALF THE DAYS
6. BECOMING EASILY ANNOYED OR IRRITABLE: NEARLY EVERY DAY

## 2024-10-03 ASSESSMENT — PATIENT HEALTH QUESTIONNAIRE - PHQ9
5. POOR APPETITE OR OVEREATING: 2 - MORE THAN HALF THE DAYS
SUM OF ALL RESPONSES TO PHQ QUESTIONS 1-9: 22
CLINICAL INTERPRETATION OF PHQ2 SCORE: 6

## 2024-10-03 ASSESSMENT — FIBROSIS 4 INDEX: FIB4 SCORE: 0.62

## 2024-10-11 ENCOUNTER — OFFICE VISIT (OUTPATIENT)
Dept: BEHAVIORAL HEALTH | Facility: CLINIC | Age: 39
End: 2024-10-11
Attending: PSYCHIATRY & NEUROLOGY
Payer: COMMERCIAL

## 2024-10-11 DIAGNOSIS — F90.2 ADHD (ATTENTION DEFICIT HYPERACTIVITY DISORDER), COMBINED TYPE: Primary | ICD-10-CM

## 2024-11-14 ENCOUNTER — TELEMEDICINE (OUTPATIENT)
Dept: BEHAVIORAL HEALTH | Facility: CLINIC | Age: 39
End: 2024-11-14
Attending: PSYCHIATRY & NEUROLOGY
Payer: COMMERCIAL

## 2024-11-14 ENCOUNTER — APPOINTMENT (OUTPATIENT)
Dept: MEDICAL GROUP | Facility: MEDICAL CENTER | Age: 39
End: 2024-11-14
Payer: COMMERCIAL

## 2024-11-14 VITALS
TEMPERATURE: 98 F | DIASTOLIC BLOOD PRESSURE: 88 MMHG | SYSTOLIC BLOOD PRESSURE: 126 MMHG | RESPIRATION RATE: 20 BRPM | HEIGHT: 68 IN | HEART RATE: 67 BPM | OXYGEN SATURATION: 97 % | BODY MASS INDEX: 25.61 KG/M2 | WEIGHT: 169 LBS

## 2024-11-14 DIAGNOSIS — Z02.89 ENCOUNTER FOR COMPLETION OF FORM WITH PATIENT: ICD-10-CM

## 2024-11-14 DIAGNOSIS — F33.1 MDD (MAJOR DEPRESSIVE DISORDER), RECURRENT EPISODE, MODERATE (HCC): ICD-10-CM

## 2024-11-14 DIAGNOSIS — F43.10 PTSD (POST-TRAUMATIC STRESS DISORDER): ICD-10-CM

## 2024-11-14 DIAGNOSIS — F41.1 GAD (GENERALIZED ANXIETY DISORDER): ICD-10-CM

## 2024-11-14 DIAGNOSIS — F90.2 ATTENTION DEFICIT HYPERACTIVITY DISORDER (ADHD), COMBINED TYPE: ICD-10-CM

## 2024-11-14 PROCEDURE — 99214 OFFICE O/P EST MOD 30 MIN: CPT | Mod: 95 | Performed by: PSYCHIATRY & NEUROLOGY

## 2024-11-14 PROCEDURE — 7101 PR PHYSICAL: Performed by: PHYSICIAN ASSISTANT

## 2024-11-14 RX ORDER — DEXTROAMPHETAMINE SACCHARATE, AMPHETAMINE ASPARTATE MONOHYDRATE, DEXTROAMPHETAMINE SULFATE AND AMPHETAMINE SULFATE 2.5; 2.5; 2.5; 2.5 MG/1; MG/1; MG/1; MG/1
10 CAPSULE, EXTENDED RELEASE ORAL EVERY MORNING
Qty: 30 CAPSULE | Refills: 0 | Status: SHIPPED | OUTPATIENT
Start: 2024-11-14 | End: 2024-12-14

## 2024-11-14 RX ORDER — ESCITALOPRAM OXALATE 10 MG/1
10 TABLET ORAL DAILY
Qty: 90 TABLET | Refills: 0 | Status: SHIPPED | OUTPATIENT
Start: 2024-11-14

## 2024-11-14 ASSESSMENT — FIBROSIS 4 INDEX: FIB4 SCORE: 0.62
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
## 2024-11-15 NOTE — PROGRESS NOTES
Subjective:     History of Present Illness  The patient presents for evaluation of generalized anxiety disorder and PTSD.    She is seeking an increase in her therapy sessions from 1 to 4 days per week due to the initiation of a new therapy program. Dr. Caicedo, whom she saw today, has recommended a 6-week outpatient trauma therapy program. Additionally, she is attending a work-provided therapy session weekly. Dr. Caicedo has also suggested one-on-one trauma therapy with a provider at her workplace.    She has been prescribed Lexapro, which she has been taking for the past 6 weeks, and Adderall, which she has not yet started. She is requesting a full day off per week to accommodate her therapy sessions. Her last visit to me was on 04/30/2024. She has not been admitted for an overnight stay. She is currently under the care of a psychiatrist and is not pregnant.    She is able to perform her job functions but requires a reduced schedule due to her condition. She works 12-hour shifts and is requesting an excused absence if she needs to call out on a scheduled workday. Her treatment schedule is subject to change weekly due to the availability of her providers. She is requesting 4 appointments per month, each lasting 12 hours or 1 day. She is also requesting 4 days off per week for a few weeks to attend intensive outpatient therapy.      Current medicines (including changes today)  Current Outpatient Medications   Medication Sig Dispense Refill    amphetamine-dextroamphetamine XR (ADDERALL XR) 10 MG CAPSULE SR 24 HR Take 1 Capsule by mouth every morning for 30 days. 30 Capsule 0    escitalopram (LEXAPRO) 10 MG Tab Take 1 Tablet by mouth every day. 90 Tablet 0     No current facility-administered medications for this visit.     She  has a past medical history of Anemia, Anxiety, Depression, Head ache, and Herpes simplex type 1 infection.    ROS   No chest pain, no shortness of breath, no abdominal pain  Positive ROS as per  "HPI.  All other systems reviewed and are negative.     Objective:     /88   Pulse 67   Temp 36.7 °C (98 °F) (Temporal)   Resp 20   Ht 1.727 m (5' 8\")   Wt 76.7 kg (169 lb)   SpO2 97%  Body mass index is 25.7 kg/m².   Physical Exam    Constitutional: Alert, no distress.  Skin: Warm, dry, good turgor, no rashes in visible areas.  Eye: Equal, round and reactive, conjunctiva clear, lids normal.  ENMT: Lips without lesions, good dentition, oropharynx clear.  Neck: Trachea midline, no masses, no thyromegaly. No cervical or supraclavicular lymphadenopathy  Respiratory: Unlabored respiratory effort, lungs clear to auscultation, no wheezes, no ronchi.  Cardiovascular: Normal S1, S2, no murmur, no edema.  Abdomen: Soft, non-tender, no masses, no hepatosplenomegaly.  Psych: Alert and oriented x3, normal affect and mood.      Results          Assessment and Plan:   The following treatment plan was discussed    Assessment & Plan  1. Generalized Anxiety Disorder.  She is currently on Lexapro 10 mg and will continue this dosage. She has also been prescribed Adderall 10 mg, which she has not yet started. A FMLA form was completed, indicating the need for a reduced work schedule of 12 hours per day, one day per week, from June 2024 through July 1, 2025. It was also noted that she may require 4 days off per week for a few weeks to attend intensive outpatient therapy. The form was scanned, stamped, and an original copy was provided to her. She is advised to follow up in 90 days to monitor her progress and medication adjustments.    2. Post-Traumatic Stress Disorder (PTSD).  She is undergoing outpatient trauma therapy and work-provided therapy weekly. She is also recommended to do one-on-one trauma therapy with a provider. The FMLA form reflects her need for a reduced work schedule to accommodate her therapy sessions.    Follow-up  Return in 90 days for follow-up.    Johanna was seen today for paperwork.    Diagnoses and all " orders for this visit:    SANTA (generalized anxiety disorder)    PTSD (post-traumatic stress disorder)    MDD (major depressive disorder), recurrent episode, moderate (HCC)    Encounter for completion of form with patient      () Today's E/M visit is associated with medical care services that serve as the continuing focal point for all needed health care services and/or with medical care services that are part of ongoing care related to a patient's single, serious condition, or a complex condition: This includes furnishing services to patients on an ongoing basis that result in care that is personalized to the patient. The services result in a comprehensive, longitudinal, and continuous relationship with the patient and involve delivery of team-based care that is accessible, coordinated with other practitioners and providers, and integrated with the broader health care landscape.             Follow Up: 12 weeks    Please note that this dictation was created using voice recognition software. I have made every reasonable attempt to correct obvious errors, but I expect that there are errors of grammar and possibly content that I did not discover before finalizing the note.      Attestation      Verbal consent was acquired by the patient to use [employer] ambient listening note generation during this visit Yes

## 2024-12-10 ENCOUNTER — HOSPITAL ENCOUNTER (OUTPATIENT)
Dept: RADIOLOGY | Facility: MEDICAL CENTER | Age: 39
End: 2024-12-10
Attending: OBSTETRICS & GYNECOLOGY
Payer: COMMERCIAL

## 2024-12-10 DIAGNOSIS — N83.202 CYST OF LEFT OVARY: ICD-10-CM

## 2024-12-10 PROCEDURE — 76830 TRANSVAGINAL US NON-OB: CPT

## 2024-12-17 ENCOUNTER — TELEMEDICINE (OUTPATIENT)
Dept: BEHAVIORAL HEALTH | Facility: CLINIC | Age: 39
End: 2024-12-17
Payer: COMMERCIAL

## 2024-12-17 DIAGNOSIS — F33.0 MDD (MAJOR DEPRESSIVE DISORDER), RECURRENT EPISODE, MILD (HCC): ICD-10-CM

## 2024-12-17 DIAGNOSIS — F43.10 PTSD (POST-TRAUMATIC STRESS DISORDER): ICD-10-CM

## 2024-12-17 DIAGNOSIS — F90.2 ATTENTION DEFICIT HYPERACTIVITY DISORDER (ADHD), COMBINED TYPE: ICD-10-CM

## 2024-12-17 PROCEDURE — 99214 OFFICE O/P EST MOD 30 MIN: CPT | Mod: 95 | Performed by: PSYCHIATRY & NEUROLOGY

## 2024-12-17 RX ORDER — DEXTROAMPHETAMINE SACCHARATE, AMPHETAMINE ASPARTATE MONOHYDRATE, DEXTROAMPHETAMINE SULFATE AND AMPHETAMINE SULFATE 2.5; 2.5; 2.5; 2.5 MG/1; MG/1; MG/1; MG/1
10 CAPSULE, EXTENDED RELEASE ORAL EVERY MORNING
COMMUNITY
Start: 2024-11-18 | End: 2024-12-17

## 2024-12-17 RX ORDER — DEXTROAMPHETAMINE SACCHARATE, AMPHETAMINE ASPARTATE MONOHYDRATE, DEXTROAMPHETAMINE SULFATE AND AMPHETAMINE SULFATE 3.75; 3.75; 3.75; 3.75 MG/1; MG/1; MG/1; MG/1
15 CAPSULE, EXTENDED RELEASE ORAL EVERY MORNING
Qty: 30 CAPSULE | Refills: 0 | Status: SHIPPED | OUTPATIENT
Start: 2024-12-17 | End: 2025-01-16

## 2024-12-17 RX ORDER — DEXTROAMPHETAMINE SACCHARATE, AMPHETAMINE ASPARTATE MONOHYDRATE, DEXTROAMPHETAMINE SULFATE AND AMPHETAMINE SULFATE 3.75; 3.75; 3.75; 3.75 MG/1; MG/1; MG/1; MG/1
15 CAPSULE, EXTENDED RELEASE ORAL EVERY MORNING
Qty: 30 CAPSULE | Refills: 0 | Status: SHIPPED | OUTPATIENT
Start: 2025-01-15 | End: 2025-02-14

## 2024-12-17 RX ORDER — ESCITALOPRAM OXALATE 10 MG/1
10 TABLET ORAL DAILY
Qty: 90 TABLET | Refills: 0 | Status: SHIPPED | OUTPATIENT
Start: 2024-12-17
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
## 2025-02-13 ENCOUNTER — TELEMEDICINE (OUTPATIENT)
Dept: BEHAVIORAL HEALTH | Facility: CLINIC | Age: 40
End: 2025-02-13
Payer: COMMERCIAL

## 2025-02-13 DIAGNOSIS — F33.0 MDD (MAJOR DEPRESSIVE DISORDER), RECURRENT EPISODE, MILD (HCC): ICD-10-CM

## 2025-02-13 DIAGNOSIS — F41.1 GAD (GENERALIZED ANXIETY DISORDER): ICD-10-CM

## 2025-02-13 DIAGNOSIS — F43.10 PTSD (POST-TRAUMATIC STRESS DISORDER): ICD-10-CM

## 2025-02-13 DIAGNOSIS — F90.2 ATTENTION DEFICIT HYPERACTIVITY DISORDER (ADHD), COMBINED TYPE: ICD-10-CM

## 2025-02-13 RX ORDER — ESCITALOPRAM OXALATE 10 MG/1
10 TABLET ORAL DAILY
Qty: 90 TABLET | Refills: 0 | Status: SHIPPED | OUTPATIENT
Start: 2025-02-13

## 2025-02-13 RX ORDER — DEXTROAMPHETAMINE SACCHARATE, AMPHETAMINE ASPARTATE MONOHYDRATE, DEXTROAMPHETAMINE SULFATE AND AMPHETAMINE SULFATE 3.75; 3.75; 3.75; 3.75 MG/1; MG/1; MG/1; MG/1
15 CAPSULE, EXTENDED RELEASE ORAL EVERY MORNING
Qty: 30 CAPSULE | Refills: 0 | Status: SHIPPED | OUTPATIENT
Start: 2025-03-24 | End: 2025-04-23

## 2025-02-13 RX ORDER — DEXTROAMPHETAMINE SACCHARATE, AMPHETAMINE ASPARTATE MONOHYDRATE, DEXTROAMPHETAMINE SULFATE AND AMPHETAMINE SULFATE 3.75; 3.75; 3.75; 3.75 MG/1; MG/1; MG/1; MG/1
15 CAPSULE, EXTENDED RELEASE ORAL EVERY MORNING
Qty: 30 CAPSULE | Refills: 0 | Status: SHIPPED | OUTPATIENT
Start: 2025-04-22 | End: 2025-05-22

## 2025-02-13 RX ORDER — DEXTROAMPHETAMINE SACCHARATE, AMPHETAMINE ASPARTATE MONOHYDRATE, DEXTROAMPHETAMINE SULFATE AND AMPHETAMINE SULFATE 3.75; 3.75; 3.75; 3.75 MG/1; MG/1; MG/1; MG/1
15 CAPSULE, EXTENDED RELEASE ORAL EVERY MORNING
Qty: 30 CAPSULE | Refills: 0 | Status: SHIPPED | OUTPATIENT
Start: 2025-02-23 | End: 2025-03-25
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
## 2025-02-20 ENCOUNTER — APPOINTMENT (OUTPATIENT)
Dept: MEDICAL GROUP | Age: 40
End: 2025-02-20
Payer: COMMERCIAL

## 2025-02-24 ENCOUNTER — APPOINTMENT (OUTPATIENT)
Dept: MEDICAL GROUP | Age: 40
End: 2025-02-24
Payer: COMMERCIAL

## 2025-03-04 ENCOUNTER — APPOINTMENT (OUTPATIENT)
Dept: MEDICAL GROUP | Age: 40
End: 2025-03-04
Payer: COMMERCIAL

## 2025-03-06 ENCOUNTER — TELEMEDICINE (OUTPATIENT)
Dept: MEDICAL GROUP | Age: 40
End: 2025-03-06
Payer: COMMERCIAL

## 2025-03-06 VITALS — BODY MASS INDEX: 23.79 KG/M2 | HEIGHT: 68 IN | RESPIRATION RATE: 12 BRPM | WEIGHT: 157 LBS

## 2025-03-06 DIAGNOSIS — F33.1 MDD (MAJOR DEPRESSIVE DISORDER), RECURRENT EPISODE, MODERATE (HCC): ICD-10-CM

## 2025-03-06 DIAGNOSIS — Z71.6 ENCOUNTER FOR SMOKING CESSATION COUNSELING: ICD-10-CM

## 2025-03-06 DIAGNOSIS — F43.10 PTSD (POST-TRAUMATIC STRESS DISORDER): ICD-10-CM

## 2025-03-06 DIAGNOSIS — F41.1 GAD (GENERALIZED ANXIETY DISORDER): ICD-10-CM

## 2025-03-06 DIAGNOSIS — G25.81 RESTLESS LEGS: ICD-10-CM

## 2025-03-06 DIAGNOSIS — Z12.31 ENCOUNTER FOR SCREENING MAMMOGRAM FOR BREAST CANCER: ICD-10-CM

## 2025-03-06 DIAGNOSIS — F90.2 ATTENTION DEFICIT HYPERACTIVITY DISORDER (ADHD), COMBINED TYPE: ICD-10-CM

## 2025-03-06 PROCEDURE — 99214 OFFICE O/P EST MOD 30 MIN: CPT | Performed by: PHYSICIAN ASSISTANT

## 2025-03-06 RX ORDER — BUPROPION HYDROCHLORIDE 150 MG/1
150 TABLET ORAL EVERY MORNING
Qty: 30 TABLET | Refills: 2 | Status: SHIPPED | OUTPATIENT
Start: 2025-03-06

## 2025-03-06 ASSESSMENT — FIBROSIS 4 INDEX: FIB4 SCORE: 0.62

## 2025-03-06 ASSESSMENT — PATIENT HEALTH QUESTIONNAIRE - PHQ9: CLINICAL INTERPRETATION OF PHQ2 SCORE: 0

## 2025-03-07 NOTE — PROGRESS NOTES
Subjective:     History of Present Illness  The patient presents via virtual visit for evaluation of restless legs syndrome, ADHD, depression, and smoking cessation.    She reports a significant improvement in her mood, attributing this positive change to the combined effect of Adderall 15 mg and Lexapro 10 mg. She expresses satisfaction with the current treatment regimen and does not require any additional medication at this time. She has been adhering to her scheduled appointments and is managing her busy schedule effectively. She also mentions that she has difficulty swallowing medication, necessitating the use of apple sauce for Adderall and halving the Lexapro tablet.    She has been experiencing symptoms suggestive of restless leg syndrome for the past 1 to 2 months. These symptoms include an incessant need to move her feet, which she finds particularly bothersome during periods of rest and sleep. She is uncertain if these symptoms are a side effect of her medication or a result of her occupation, which involves prolonged standing. She is open to trying magnesium supplements but prefers a powder form due to her difficulty swallowing pills.    She has expressed a desire to quit smoking and is considering the use of bupropion as an aid. She smokes approximately six cigarettes per day, a habit she has maintained since the age of 12. She acknowledges the need to quit smoking, particularly as she approaches her 40s, and is committed to improving her health.    SOCIAL HISTORY  The patient smokes an average of six cigarettes a day.    MEDICATIONS  Adderall, Lexapro      Current medicines (including changes today)  Current Outpatient Medications   Medication Sig Dispense Refill    buPROPion (WELLBUTRIN XL) 150 MG XL tablet Take 1 Tablet by mouth every morning. 30 Tablet 2    amphetamine-dextroamphetamine (ADDERALL XR) 15 MG XR capsule Take 1 Capsule by mouth every morning for 30 days. 30 Capsule 0    [START ON  "3/24/2025] amphetamine-dextroamphetamine (ADDERALL XR) 15 MG XR capsule Take 1 Capsule by mouth every morning for 30 days. 30 Capsule 0    [START ON 4/22/2025] amphetamine-dextroamphetamine (ADDERALL XR) 15 MG XR capsule Take 1 Capsule by mouth every morning for 30 days. 30 Capsule 0    escitalopram (LEXAPRO) 10 MG Tab Take 1 Tablet by mouth every day. 90 Tablet 0     No current facility-administered medications for this visit.     She  has a past medical history of Anemia, Anxiety, Depression, Head ache, and Herpes simplex type 1 infection.    ROS   No chest pain, no shortness of breath, no abdominal pain  Positive ROS as per HPI.  All other systems reviewed and are negative.     Objective:     Resp 12   Ht 1.727 m (5' 8\")   Wt 71.2 kg (157 lb)  Body mass index is 23.87 kg/m².  Physical Exam    Constitutional: Alert, no distress.  Skin: Warm, dry, good turgor, no rashes in visible areas.  Eye: Equal, round and reactive, conjunctiva clear, lids normal.  ENMT: Lips without lesions, good dentition, oropharynx clear.  Neck: Trachea midline, no masses, no thyromegaly. No cervical or supraclavicular lymphadenopathy  Respiratory: Unlabored respiratory effort, lungs clear to auscultation, no wheezes, no ronchi.  Cardiovascular: Normal S1, S2, no murmur, no edema.  Abdomen: Soft, non-tender, no masses, no hepatosplenomegaly.  Psych: Alert and oriented x3, normal affect and mood.      Results          Assessment and Plan:   The following treatment plan was discussed    Assessment & Plan  1. Restless leg syndrome.  The patient's symptoms may be attributed to her current medication regimen, specifically Adderall, which can induce jitteriness and muscle twitching. However, these symptoms do not align with the typical presentation of restless leg syndrome. It is also possible that her symptoms are indicative of a deficiency in vitamin B or magnesium. She has been advised to discontinue Adderall for a trial period to observe " any changes in her symptoms. Additionally, she has been recommended to take magnesium glycinate powder 350 mg before bedtime to aid in muscle relaxation. She will monitor her symptoms over the next month and provide an update during her next appointment.    2. Smoking cessation.  A prescription for bupropion has been issued to assist in her smoking cessation efforts. She has been instructed to take this medication in the morning and not at night. She will commence the medication a week prior to her planned quit date. Potential side effects, including headache, nausea, weight loss, and anxiety, have been discussed. She will provide feedback on the effectiveness of the medication during her next visit.    3. ADHD.  She is currently on Adderall 15 mg and Lexapro 10 mg, which together have been effective in managing her symptoms. She will continue with this regimen. The potential impact of bupropion on her ADHD symptoms has been discussed, and she will monitor for any changes. Continue care with dr schmitt    4. Depression.  She is currently on Lexapro 10 mg, which has been effective in managing her symptoms. She will continue with this regimen. The potential benefits of bupropion in combination with Lexapro for depression have been discussed.    5. Health maintenance.  A mammogram has been ordered as part of her routine health maintenance.      ORDERS:  1. SANTA (generalized anxiety disorder)      2. PTSD (post-traumatic stress disorder)      3. MDD (major depressive disorder), recurrent episode, moderate (HCC)      4. Attention deficit hyperactivity disorder (ADHD), combined type      5. Encounter for screening mammogram for breast cancer      6. Restless legs      7. Encounter for smoking cessation counseling    - buPROPion (WELLBUTRIN XL) 150 MG XL tablet; Take 1 Tablet by mouth every morning.  Dispense: 30 Tablet; Refill: 2    () Today's E/M visit is associated with medical care services that serve as the  continuing focal point for all needed health care services and/or with medical care services that are part of ongoing care related to a patient's single, serious condition, or a complex condition: This includes furnishing services to patients on an ongoing basis that result in care that is personalized to the patient. The services result in a comprehensive, longitudinal, and continuous relationship with the patient and involve delivery of team-based care that is accessible, coordinated with other practitioners and providers, and integrated with the broader health care landscape.           Follow Up: 12 weeks    Please note that this dictation was created using voice recognition software. I have made every reasonable attempt to correct obvious errors, but I expect that there are errors of grammar and possibly content that I did not discover before finalizing the note.      This evaluation was conducted via Teams using secure and encrypted videoconferencing technology. The patient was in their home in the [facility].    The patient's identity was confirmed and verbal consent was obtained for this virtual visit.    Attestation      Verbal consent was acquired by the patient to use [employer] ambient listening note generation during this visit No

## 2025-04-15 ENCOUNTER — APPOINTMENT (OUTPATIENT)
Dept: MEDICAL GROUP | Age: 40
End: 2025-04-15
Payer: COMMERCIAL

## 2025-06-27 ENCOUNTER — TELEMEDICINE (OUTPATIENT)
Dept: BEHAVIORAL HEALTH | Facility: CLINIC | Age: 40
End: 2025-06-27
Payer: COMMERCIAL

## 2025-06-27 DIAGNOSIS — F90.2 ATTENTION DEFICIT HYPERACTIVITY DISORDER (ADHD), COMBINED TYPE: ICD-10-CM

## 2025-06-27 DIAGNOSIS — F41.1 GAD (GENERALIZED ANXIETY DISORDER): ICD-10-CM

## 2025-06-27 DIAGNOSIS — F43.10 PTSD (POST-TRAUMATIC STRESS DISORDER): ICD-10-CM

## 2025-06-27 DIAGNOSIS — F33.0 MDD (MAJOR DEPRESSIVE DISORDER), RECURRENT EPISODE, MILD (HCC): Primary | ICD-10-CM

## 2025-06-27 PROBLEM — F33.1 MDD (MAJOR DEPRESSIVE DISORDER), RECURRENT EPISODE, MODERATE (HCC): Status: RESOLVED | Noted: 2025-03-06 | Resolved: 2025-06-27
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
## 2025-07-07 ENCOUNTER — APPOINTMENT (OUTPATIENT)
Dept: RADIOLOGY | Facility: MEDICAL CENTER | Age: 40
End: 2025-07-07
Attending: PHYSICIAN ASSISTANT
Payer: COMMERCIAL

## 2025-07-30 DIAGNOSIS — Z71.6 ENCOUNTER FOR SMOKING CESSATION COUNSELING: ICD-10-CM

## 2025-07-30 RX ORDER — BUPROPION HYDROCHLORIDE 150 MG/1
150 TABLET ORAL EVERY MORNING
Qty: 90 TABLET | Refills: 1 | Status: SHIPPED | OUTPATIENT
Start: 2025-07-30

## 2025-08-06 ENCOUNTER — APPOINTMENT (OUTPATIENT)
Dept: OBGYN | Facility: CLINIC | Age: 40
End: 2025-08-06
Payer: COMMERCIAL